# Patient Record
Sex: FEMALE | Race: WHITE | NOT HISPANIC OR LATINO | Employment: FULL TIME | URBAN - METROPOLITAN AREA
[De-identification: names, ages, dates, MRNs, and addresses within clinical notes are randomized per-mention and may not be internally consistent; named-entity substitution may affect disease eponyms.]

---

## 2022-10-03 ENCOUNTER — OFFICE VISIT (OUTPATIENT)
Dept: URGENT CARE | Facility: CLINIC | Age: 32
End: 2022-10-03
Payer: COMMERCIAL

## 2022-10-03 ENCOUNTER — HOSPITAL ENCOUNTER (OUTPATIENT)
Dept: RADIOLOGY | Facility: HOSPITAL | Age: 32
Discharge: HOME | End: 2022-10-03
Payer: COMMERCIAL

## 2022-10-03 VITALS
HEIGHT: 67 IN | TEMPERATURE: 99 F | SYSTOLIC BLOOD PRESSURE: 156 MMHG | WEIGHT: 227.38 LBS | BODY MASS INDEX: 35.69 KG/M2 | DIASTOLIC BLOOD PRESSURE: 90 MMHG | RESPIRATION RATE: 16 BRPM | OXYGEN SATURATION: 100 % | HEART RATE: 58 BPM

## 2022-10-03 DIAGNOSIS — T14.90XA INJURY: Primary | ICD-10-CM

## 2022-10-03 DIAGNOSIS — T14.90XA INJURY: ICD-10-CM

## 2022-10-03 PROCEDURE — 99213 OFFICE O/P EST LOW 20 MIN: CPT | Mod: S$PBB,,,

## 2022-10-03 PROCEDURE — 73110 X-RAY EXAM OF WRIST: CPT | Mod: TC,LT

## 2022-10-03 PROCEDURE — 99213 PR OFFICE/OUTPT VISIT, EST, LEVL III, 20-29 MIN: ICD-10-PCS | Mod: S$PBB,,,

## 2022-10-03 PROCEDURE — 99204 OFFICE O/P NEW MOD 45 MIN: CPT | Mod: PBBFAC

## 2022-10-03 PROCEDURE — 73130 X-RAY EXAM OF HAND: CPT | Mod: TC,LT

## 2022-10-03 RX ORDER — LAMOTRIGINE 100 MG/1
100 TABLET ORAL 2 TIMES DAILY
COMMUNITY
Start: 2022-08-15

## 2022-10-03 RX ORDER — HYDROXYZINE HYDROCHLORIDE 25 MG/1
25 TABLET, FILM COATED ORAL DAILY
COMMUNITY
Start: 2022-07-28

## 2022-10-03 RX ORDER — VENLAFAXINE HYDROCHLORIDE 75 MG/1
75 CAPSULE, EXTENDED RELEASE ORAL DAILY
COMMUNITY
Start: 2022-08-16

## 2022-10-03 RX ORDER — CLONIDINE HYDROCHLORIDE 0.1 MG/1
0.1 TABLET ORAL 2 TIMES DAILY
Status: ON HOLD | COMMUNITY
Start: 2022-08-15 | End: 2023-09-21 | Stop reason: HOSPADM

## 2022-10-03 RX ORDER — MODAFINIL 200 MG/1
200 TABLET ORAL DAILY
COMMUNITY
Start: 2022-09-30

## 2022-10-03 NOTE — PROGRESS NOTES
"Subjective:       Patient ID: Cha Puckett is a 31 y.o. female.    Vitals:  height is 5' 6.93" (1.7 m) and weight is 103.1 kg (227 lb 6.4 oz). Her temperature is 98.8 °F (37.1 °C). Her blood pressure is 156/90 (abnormal) and her pulse is 58 (abnormal). Her respiration is 16 and oxygen saturation is 100%.     Chief Complaint: Injury (Lt hand, 5th digit pain, hit fridge around 11am yesterday.)    Pt states punching a refrigerator yesterday afternoon with L hand. States since then she has had swelling and pain in knuckles and wrist.    Injury  Associated symptoms include arthralgias and joint swelling.     Constitution: Negative.   HENT: Negative.     Neck: neck negative.   Cardiovascular: Negative.    Eyes: Negative.    Respiratory: Negative.     Gastrointestinal: Negative.    Genitourinary: Negative.    Musculoskeletal:  Positive for pain, trauma, joint pain and joint swelling.   Skin: Negative.    Neurological: Negative.      Objective:      Physical Exam   HENT:   Head: Normocephalic.   Eyes: Pupils are equal, round, and reactive to light.   Cardiovascular: Normal pulses. Bradycardia present.   Pulmonary/Chest: Effort normal.   Abdominal: Normal appearance.   Musculoskeletal:         General: Swelling, tenderness and signs of injury present.      Left hand: Left ring finger: Exhibits decreased ROM and ecchymosis. There is tenderness of the MCP joint. Left little finger: Exhibits decreased ROM and ecchymosis. There is tenderness of the MCP joint.        Hands:    Neurological: She is alert.   Skin: Skin is warm and dry. Capillary refill takes less than 2 seconds. bruising   Denies snuffbox tenderness.     Narrative & Impression  EXAMINATION:  XR HAND COMPLETE 3 VIEW LEFT; XR WRIST COMPLETE 3 VIEWS LEFT     CLINICAL HISTORY:  Injury, unspecified, initial encounter     COMPARISON:  None.     FINDINGS:  There is no acute fracture or malalignment.  The soft tissues are unremarkable.     Impression:     No acute " abnormality identified.    EXAMINATION:  XR HAND COMPLETE 3 VIEW LEFT; XR WRIST COMPLETE 3 VIEWS LEFT     CLINICAL HISTORY:  Injury, unspecified, initial encounter     COMPARISON:  None.     FINDINGS:  There is no acute fracture or malalignment.  The soft tissues are unremarkable.     Impression:     No acute abnormality identified.    Assessment:       1. Injury            Plan:         Injury  -     XR HAND COMPLETE 3 VIEW LEFT; Future; Expected date: 10/03/2022  -     X-Ray Wrist Complete Left; Future; Expected date: 10/03/2022    No acute abnormality identified.    Rest, ice and elevate wrist.    Tylenol and Ibuprofen for pain.

## 2023-09-18 ENCOUNTER — HOSPITAL ENCOUNTER (EMERGENCY)
Facility: HOSPITAL | Age: 33
Discharge: HOME | End: 2023-09-18
Attending: INTERNAL MEDICINE
Payer: COMMERCIAL

## 2023-09-18 VITALS
TEMPERATURE: 98 F | DIASTOLIC BLOOD PRESSURE: 93 MMHG | OXYGEN SATURATION: 97 % | HEART RATE: 64 BPM | SYSTOLIC BLOOD PRESSURE: 158 MMHG | BODY MASS INDEX: 34.1 KG/M2 | RESPIRATION RATE: 20 BRPM | HEIGHT: 68 IN | WEIGHT: 225 LBS

## 2023-09-18 DIAGNOSIS — S99.919A ANKLE INJURY, INITIAL ENCOUNTER: ICD-10-CM

## 2023-09-18 DIAGNOSIS — S82.831A CLOSED FRACTURE OF DISTAL END OF RIGHT FIBULA, UNSPECIFIED FRACTURE MORPHOLOGY, INITIAL ENCOUNTER: Primary | ICD-10-CM

## 2023-09-18 DIAGNOSIS — M25.371 UNSTABLE RIGHT ANKLE: ICD-10-CM

## 2023-09-18 DIAGNOSIS — S80.211A ABRASION OF RIGHT KNEE, INITIAL ENCOUNTER: ICD-10-CM

## 2023-09-18 PROCEDURE — 99283 EMERGENCY DEPT VISIT LOW MDM: CPT | Mod: 25

## 2023-09-18 PROCEDURE — 25000003 PHARM REV CODE 250: Performed by: INTERNAL MEDICINE

## 2023-09-18 PROCEDURE — 29515 APPLICATION SHORT LEG SPLINT: CPT | Mod: RT

## 2023-09-18 RX ORDER — OXYCODONE AND ACETAMINOPHEN 5; 325 MG/1; MG/1
1 TABLET ORAL EVERY 6 HOURS PRN
Qty: 15 TABLET | Refills: 0 | Status: ON HOLD | OUTPATIENT
Start: 2023-09-18 | End: 2023-09-21 | Stop reason: HOSPADM

## 2023-09-18 RX ORDER — ONDANSETRON 4 MG/1
4 TABLET, ORALLY DISINTEGRATING ORAL EVERY 6 HOURS PRN
Qty: 15 TABLET | Refills: 0 | Status: SHIPPED | OUTPATIENT
Start: 2023-09-18

## 2023-09-18 RX ORDER — ONDANSETRON 4 MG/1
4 TABLET, ORALLY DISINTEGRATING ORAL ONCE
Status: COMPLETED | OUTPATIENT
Start: 2023-09-18 | End: 2023-09-18

## 2023-09-18 RX ORDER — OXYCODONE AND ACETAMINOPHEN 5; 325 MG/1; MG/1
1 TABLET ORAL ONCE
Status: COMPLETED | OUTPATIENT
Start: 2023-09-18 | End: 2023-09-18

## 2023-09-18 RX ORDER — IBUPROFEN 600 MG/1
600 TABLET ORAL EVERY 6 HOURS PRN
Qty: 20 TABLET | Refills: 0 | Status: ON HOLD | OUTPATIENT
Start: 2023-09-18 | End: 2023-09-21 | Stop reason: HOSPADM

## 2023-09-18 RX ADMIN — ONDANSETRON 4 MG: 4 TABLET, ORALLY DISINTEGRATING ORAL at 07:09

## 2023-09-18 RX ADMIN — BACITRACIN ZINC, NEOMYCIN SULFATE, AND POLYMYXIN B SULFATE: 400; 3.5; 5 OINTMENT TOPICAL at 07:09

## 2023-09-18 RX ADMIN — OXYCODONE HYDROCHLORIDE AND ACETAMINOPHEN 1 TABLET: 5; 325 TABLET ORAL at 07:09

## 2023-09-19 ENCOUNTER — LAB VISIT (OUTPATIENT)
Dept: LAB | Facility: HOSPITAL | Age: 33
End: 2023-09-19
Attending: REHABILITATION UNIT
Payer: COMMERCIAL

## 2023-09-19 ENCOUNTER — HOSPITAL ENCOUNTER (OUTPATIENT)
Dept: RADIOLOGY | Facility: CLINIC | Age: 33
Discharge: HOME | End: 2023-09-19
Attending: REHABILITATION UNIT
Payer: COMMERCIAL

## 2023-09-19 ENCOUNTER — OFFICE VISIT (OUTPATIENT)
Dept: ORTHOPEDICS | Facility: CLINIC | Age: 33
End: 2023-09-19
Payer: COMMERCIAL

## 2023-09-19 VITALS
SYSTOLIC BLOOD PRESSURE: 152 MMHG | BODY MASS INDEX: 34.1 KG/M2 | WEIGHT: 225 LBS | HEART RATE: 59 BPM | TEMPERATURE: 99 F | DIASTOLIC BLOOD PRESSURE: 65 MMHG | HEIGHT: 68 IN

## 2023-09-19 DIAGNOSIS — S82.831A CLOSED FRACTURE OF DISTAL END OF RIGHT FIBULA, UNSPECIFIED FRACTURE MORPHOLOGY, INITIAL ENCOUNTER: Primary | ICD-10-CM

## 2023-09-19 DIAGNOSIS — Z01.812 PRE-OPERATIVE LABORATORY EXAMINATION: ICD-10-CM

## 2023-09-19 DIAGNOSIS — S82.831A CLOSED FRACTURE OF DISTAL END OF RIGHT FIBULA, UNSPECIFIED FRACTURE MORPHOLOGY, INITIAL ENCOUNTER: ICD-10-CM

## 2023-09-19 LAB
ALBUMIN SERPL-MCNC: 4.3 G/DL (ref 3.5–5)
ALBUMIN/GLOB SERPL: 1.1 RATIO (ref 1.1–2)
ALP SERPL-CCNC: 66 UNIT/L (ref 40–150)
ALT SERPL-CCNC: 31 UNIT/L (ref 0–55)
AST SERPL-CCNC: 27 UNIT/L (ref 5–34)
BASOPHILS # BLD AUTO: 0.06 X10(3)/MCL
BASOPHILS NFR BLD AUTO: 0.7 %
BILIRUB SERPL-MCNC: 0.8 MG/DL
BUN SERPL-MCNC: 8.6 MG/DL (ref 7–18.7)
CALCIUM SERPL-MCNC: 9.8 MG/DL (ref 8.4–10.2)
CHLORIDE SERPL-SCNC: 104 MMOL/L (ref 98–107)
CO2 SERPL-SCNC: 26 MMOL/L (ref 22–29)
CREAT SERPL-MCNC: 0.79 MG/DL (ref 0.55–1.02)
EOSINOPHIL # BLD AUTO: 0.46 X10(3)/MCL (ref 0–0.9)
EOSINOPHIL NFR BLD AUTO: 5.1 %
ERYTHROCYTE [DISTWIDTH] IN BLOOD BY AUTOMATED COUNT: 12.4 % (ref 11.5–17)
GFR SERPLBLD CREATININE-BSD FMLA CKD-EPI: >60 MLS/MIN/1.73/M2
GLOBULIN SER-MCNC: 3.8 GM/DL (ref 2.4–3.5)
GLUCOSE SERPL-MCNC: 97 MG/DL (ref 74–100)
HCT VFR BLD AUTO: 40.5 % (ref 37–47)
HGB BLD-MCNC: 14.1 G/DL (ref 12–16)
IMM GRANULOCYTES # BLD AUTO: 0.03 X10(3)/MCL (ref 0–0.04)
IMM GRANULOCYTES NFR BLD AUTO: 0.3 %
LYMPHOCYTES # BLD AUTO: 1.88 X10(3)/MCL (ref 0.6–4.6)
LYMPHOCYTES NFR BLD AUTO: 20.7 %
MCH RBC QN AUTO: 32.6 PG (ref 27–31)
MCHC RBC AUTO-ENTMCNC: 34.8 G/DL (ref 33–36)
MCV RBC AUTO: 93.5 FL (ref 80–94)
MONOCYTES # BLD AUTO: 0.7 X10(3)/MCL (ref 0.1–1.3)
MONOCYTES NFR BLD AUTO: 7.7 %
NEUTROPHILS # BLD AUTO: 5.96 X10(3)/MCL (ref 2.1–9.2)
NEUTROPHILS NFR BLD AUTO: 65.5 %
NRBC BLD AUTO-RTO: 0 %
PLATELET # BLD AUTO: 232 X10(3)/MCL (ref 130–400)
PMV BLD AUTO: 10.4 FL (ref 7.4–10.4)
POTASSIUM SERPL-SCNC: 3.7 MMOL/L (ref 3.5–5.1)
PROT SERPL-MCNC: 8.1 GM/DL (ref 6.4–8.3)
RBC # BLD AUTO: 4.33 X10(6)/MCL (ref 4.2–5.4)
SODIUM SERPL-SCNC: 141 MMOL/L (ref 136–145)
WBC # SPEC AUTO: 9.09 X10(3)/MCL (ref 4.5–11.5)

## 2023-09-19 PROCEDURE — 3078F PR MOST RECENT DIASTOLIC BLOOD PRESSURE < 80 MM HG: ICD-10-PCS | Mod: CPTII,,, | Performed by: REHABILITATION UNIT

## 2023-09-19 PROCEDURE — 85025 COMPLETE CBC W/AUTO DIFF WBC: CPT

## 2023-09-19 PROCEDURE — 1159F MED LIST DOCD IN RCRD: CPT | Mod: CPTII,,, | Performed by: REHABILITATION UNIT

## 2023-09-19 PROCEDURE — 3077F PR MOST RECENT SYSTOLIC BLOOD PRESSURE >= 140 MM HG: ICD-10-PCS | Mod: CPTII,,, | Performed by: REHABILITATION UNIT

## 2023-09-19 PROCEDURE — 3008F PR BODY MASS INDEX (BMI) DOCUMENTED: ICD-10-PCS | Mod: CPTII,,, | Performed by: REHABILITATION UNIT

## 2023-09-19 PROCEDURE — 73590 XR TIBIA FIBULA 2 VIEW RIGHT: ICD-10-PCS | Mod: RT,,, | Performed by: REHABILITATION UNIT

## 2023-09-19 PROCEDURE — 1159F PR MEDICATION LIST DOCUMENTED IN MEDICAL RECORD: ICD-10-PCS | Mod: CPTII,,, | Performed by: REHABILITATION UNIT

## 2023-09-19 PROCEDURE — 80053 COMPREHEN METABOLIC PANEL: CPT

## 2023-09-19 PROCEDURE — 3077F SYST BP >= 140 MM HG: CPT | Mod: CPTII,,, | Performed by: REHABILITATION UNIT

## 2023-09-19 PROCEDURE — 36415 COLL VENOUS BLD VENIPUNCTURE: CPT

## 2023-09-19 PROCEDURE — 99204 OFFICE O/P NEW MOD 45 MIN: CPT | Mod: ,,, | Performed by: REHABILITATION UNIT

## 2023-09-19 PROCEDURE — 3078F DIAST BP <80 MM HG: CPT | Mod: CPTII,,, | Performed by: REHABILITATION UNIT

## 2023-09-19 PROCEDURE — 3008F BODY MASS INDEX DOCD: CPT | Mod: CPTII,,, | Performed by: REHABILITATION UNIT

## 2023-09-19 PROCEDURE — 99204 PR OFFICE/OUTPT VISIT, NEW, LEVL IV, 45-59 MIN: ICD-10-PCS | Mod: ,,, | Performed by: REHABILITATION UNIT

## 2023-09-19 PROCEDURE — 73590 X-RAY EXAM OF LOWER LEG: CPT | Mod: RT,,, | Performed by: REHABILITATION UNIT

## 2023-09-19 RX ORDER — ACETAMINOPHEN 500 MG
1000 TABLET ORAL EVERY 6 HOURS PRN
COMMUNITY

## 2023-09-19 RX ORDER — CHOLECALCIFEROL (VITAMIN D3) 25 MCG
1000 TABLET ORAL DAILY
COMMUNITY

## 2023-09-19 NOTE — H&P (VIEW-ONLY)
Subjective:      Patient ID: Cha Puckett is a 32 y.o. female.    Chief Complaint: Pain (Patient was roller skating in her house when she rolled her ankle first ever injury to her ankle went to ED last night had XR done was put inot a splint wears a [employer] 8 1/2 shoe)    HPI:   Cha Puckett is a 32 y.o. female who presents today for initial evaluation of  the right lower leg. Patient complains of injury to the right ankle. This is evaluated as a personal injury. The injury occurred 1 day ago, and occurred while she was roller skating in her house.  The patient states the ankle rolled inward at the time of injury.  She did hear or sense a pop or snap at the time of the injury. The patient notes pain and moderate swelling of the ankle since the injury. She went to the ED. Radiographs and note reviewed.  Radiographs showed an ankle fracture.  She was placed into a splint.  She was discharged with appropriate pain medication and crutches.  She has been nonweightbearing.  She denies any sensory or motor changes distally.  She works in a .  She is a nonsmoker.  She does use medicinal marijuana.    Past Medical History:   Diagnosis Date    Anxiety     Borderline personality disorder     Depression     Panic disorder (episodic paroxysmal anxiety)      Past Surgical History:   Procedure Laterality Date    APPENDECTOMY      COSMETIC SURGERY  05/2020    KNEE SURGERY Left     x 10    SKIN BIOPSY      TUBAL LIGATION      TYMPANOSTOMY TUBE PLACEMENT      WISDOM TOOTH EXTRACTION       Social History     Socioeconomic History    Marital status:    Tobacco Use    Smoking status: Never    Smokeless tobacco: Never   Substance and Sexual Activity    Alcohol use: Not Currently    Drug use: Yes     Types: Marijuana    Sexual activity: Yes     Partners: Female, Male     Birth control/protection: See Surgical Hx         Current Outpatient Medications:     ibuprofen (ADVIL,MOTRIN) 600 MG tablet, Take 1 tablet (600 mg total) by  "mouth every 6 (six) hours as needed for Pain., Disp: 20 tablet, Rfl: 0    lamoTRIgine (LAMICTAL) 100 MG tablet, Take 100 mg by mouth 2 (two) times daily., Disp: , Rfl:     modafiniL (PROVIGIL) 200 MG Tab, Take 200 mg by mouth once daily., Disp: , Rfl:     oxyCODONE-acetaminophen (PERCOCET) 5-325 mg per tablet, Take 1 tablet by mouth every 6 (six) hours as needed for Pain., Disp: 15 tablet, Rfl: 0    vitamin D (VITAMIN D3) 1000 units Tab, Take 1,000 Units by mouth once daily., Disp: , Rfl:     cloNIDine (CATAPRES) 0.1 MG tablet, Take 0.1 mg by mouth 2 (two) times daily., Disp: , Rfl:     hydrOXYzine HCL (ATARAX) 25 MG tablet, Take 25 mg by mouth once daily., Disp: , Rfl:     ondansetron (ZOFRAN-ODT) 4 MG TbDL, Take 1 tablet (4 mg total) by mouth every 6 (six) hours as needed (nausea). (Patient not taking: Reported on 9/19/2023), Disp: 15 tablet, Rfl: 0    venlafaxine (EFFEXOR-XR) 75 MG 24 hr capsule, Take 75 mg by mouth once daily., Disp: , Rfl:   No current facility-administered medications for this visit.  Review of patient's allergies indicates:   Allergen Reactions    Amoxicillin Hives       BP (!) 152/65 (BP Location: Left arm, Patient Position: Sitting) Comment: unable to obtain  Pulse (!) 59   Temp 99.2 °F (37.3 °C)   Ht 5' 8" (1.727 m)   Wt 102.1 kg (225 lb)   LMP 08/22/2023   BMI 34.21 kg/m²     Comprehensive review of systems completed and negative except as per HPI.        Objective:   Head: Normocephalic, without obvious abnormality, atraumatic  Eyes: conjunctivae/corneas clear. EOM's intact  Ears: normal external appearance  Nose: Nares normal. Septum midline. Mucosa normal. No drainage  Throat: normal findings: lips normal without lesions  Lungs: unlabored breathing on room air  Chest wall: symmetric chest rise  Heart: regular rate and rhythm  Pulses: 2+ and symmetric  Skin: Skin color, texture, turgor normal. No rashes or lesions  Neurologic: Grossly normal    right lower extremity     General :  "   alert, appears stated age, and cooperative   Gait:  Unable to assess/with crutches.    Right Ankle  Proximal Fibula:   +tenderness noted   Edema:   mild swelling of the lateral surface   Ecchymosis:   is not observed    Active ROM:  25% of normal    Passive ROM:   25% of normal    Palpation:  severe tenderness of the medial, lateral surface   Stability.:   michael   Syndosmosis:   syndesmotic ligament is tender   Sensation:    intact to light touch   Pulses:  normal DP and PT pulses   Motor: 3/5 EHL/FHL/TA/GS    Warm well perfused lower extremity with capillary refill less than 2 seconds and sensation intact to light touch in the terminal nerve distributions. Calf soft and easily compressible without clinical sign of DVT. No palpable popliteal lymphadenopathy    Assessment:     Imaging  X-Ray Ankle Complete Right  Narrative: EXAMINATION:  XR ANKLE COMPLETE 3 VIEW RIGHT    CLINICAL HISTORY:  Unspecified injury of unspecified ankle, initial encounter    TECHNIQUE:  Three views    COMPARISON:  None available.    FINDINGS:  Distal fibula is fractured.  Fracture line extends distally to about the level of the plafond.  There is mild displacement.  No disruption of the ankle mortise.  Small calcaneal enthesophyte.  Impression: Fracture distal fibula.    Electronically signed by: Alli Del Rio  Date:    09/18/2023  Time:    19:45    Previously obtained radiographs of the right ankle showed displaced distal fibula fracture with widening of the medial clear space and lateral translation of the talus. Small fracture of posterior malleolus.     Two-view radiographs of the right tibia obtained today personally reviewed showing distal fibula fracture.  No proximal injury.  Visualized knee joint space intact. Small fracture of posterior malleolus.         1. Closed fracture of distal end of right fibula, unspecified fracture morphology, initial encounter    2. Pre-operative laboratory examination          Plan:       Orders Placed  This Encounter    X-Ray Tibia Fibula 2 View Right    CT Ankle (Including Hindfoot) Without Contrast Right    CBC Auto Differential    Comprehensive Metabolic Panel    Case Request Operating Room: FIXATION, SYNDESMOSIS, ANKLE, ORIF, FRACTURE, TIBIA OR FIBULA     Imaging and exam findings discussed with the patient. She sustained an acute right ankle fracture.  There is mild displacement with widening of the medial clear space.  She has tenderness medially.  She also has a small fracture of the posterior malleolus.  This is unstable and meets operative criteria.  We will proceed with CT scan for further evaluation of the posterior malleolar component. We discussed operative and nonoperative options. The patient had an opportunity to ask questions. All questions were answered. The risks and benefits of surgery were discussed with the patient, including but not limited to bleeding, infection, damage to surrounding nerves and structures, need for further surgery, repair failure, anesthesia risk, progression of arthritis, blood clots, and medical risks of surgery. The patient voiced understanding of the risks and benefits and provided written consent to the procedure. Plan for open reduction and internal fixation of right distal fibula fracture with possible syndesmotic fixation and any indicated procedures. Patient will be scheduled for surgery at a mutually convenient date in the near future.  She was placed back into a well-padded short-leg splint.  She was instructed on nonweightbearing status and strict elevation.  Crutch ambulation.  Pain control. All questions were answered and patient is happy and in agreement with the plan.

## 2023-09-19 NOTE — ED PROVIDER NOTES
Source of History:  Patient, no limitations    Chief complaint:  Joint Swelling (Fall while roller skating deformity noted right ankle abrasion to riht knee)      HPI:  Cha Puckett is a 32 y.o. female presenting with Joint Swelling (Fall while roller skating deformity noted right ankle abrasion to riht knee)         The patient complains of pain in the distal aspect of the right leg without radiation after a twisting injury. Onset of symptoms was abrupt starting a few minutes ago. Patient describes pain as aching and throbbing. Pain severity now is severe. Pain is aggravated by movement. Pain is alleviated by immobilization. Symptoms associated with pain include loss of motion. The patient denies other injuries.  Care prior to arrival consisted of nothing        Review of Systems   Constitutional symptoms:  Negative except as documented in HPI.   Skin symptoms:  Negative except as documented in HPI.   HEENT symptoms:  Negative except as documented in HPI.   Respiratory symptoms:  Negative except as documented in HPI.   Cardiovascular symptoms:  Negative except as documented in HPI.   Gastrointestinal symptoms:  Negative except as documented in HPI.    Genitourinary symptoms:  Negative except as documented in HPI.   Musculoskeletal symptoms:  Negative except as documented in HPI.   Neurologic symptoms:  Negative except as documented in HPI.   Psychiatric symptoms:  Negative except as documented in HPI.   Allergy/immunologic symptoms:  Negative except as documented in HPI.             Additional review of systems information: All other systems reviewed and otherwise negative.      Review of patient's allergies indicates:   Allergen Reactions    Amoxicillin Hives       PMH:  As per HPI and below:    Past Medical History:   Diagnosis Date    Anxiety     Borderline personality disorder     Depression     Panic disorder (episodic paroxysmal anxiety)         No family history on file.    Past Surgical History:  "  Procedure Laterality Date    APPENDECTOMY      KNEE SURGERY Left     x 10    SKIN BIOPSY      TUBAL LIGATION      TYMPANOSTOMY TUBE PLACEMENT      WISDOM TOOTH EXTRACTION         Social History     Tobacco Use    Smoking status: Never    Smokeless tobacco: Never   Substance Use Topics    Alcohol use: Never    Drug use: Yes     Types: Marijuana       There is no problem list on file for this patient.       Physical Exam:    BP (!) 158/93 (BP Location: Right arm, Patient Position: Lying)   Pulse 64   Temp 98.4 °F (36.9 °C) (Oral)   Resp 20   Ht 5' 8" (1.727 m)   Wt 102.1 kg (225 lb)   LMP  (LMP Unknown)   SpO2 97%   Breastfeeding No   BMI 34.21 kg/m²     Nursing note and vital signs reviewed.    General:  Alert, no acute distress.   Skin: Normal for Ethnic Origin, No cyanosis  HEENT: Normocephalic and atraumatic, Vision unchanged, Pupils symmetric, No icterus , Nasal mucosa is pink and moist  Cardiovascular:  Regular rate and rhythm, No edema  Chest Wall: No deformity, equal chest rise  Respiratory:  Lungs are clear to auscultation, respirations are non-labored.    Musculoskeletal:  right distal tib/fib deformity, Normal perfusion to all extremities  Gastrointestinal:  Soft, Non distended  Neurological:  Alert and oriented, normal motor observed, normal speech observed.    Psychiatric:  Cooperative, appropriate mood & affect.        Labs that have been ordered have been independently reviewed and interpreted by myself.     Old Chart Reviewed.      Initial Impression/ Differential Dx:  Ankle sprain, fracture of foot or ankle, cellulitis, referred pain from knee or hip, contusion, abrasion,      MDM:      Reviewed Nurses Note.    Reviewed Pertinent old records.    Orders Placed This Encounter    ORTHOPEDIC BRACING FOR HOME USE - LOWER EXTREMITY    X-Ray Ankle Complete Right    Ambulatory referral/consult to Orthopedics    Application short leg splint    Stirrup Splint    Crutches    oxyCODONE-acetaminophen " 5-325 mg per tablet 1 tablet    ondansetron disintegrating tablet 4 mg    neomycin-bacitracin-polymyxin ointment    oxyCODONE-acetaminophen (PERCOCET) 5-325 mg per tablet    ondansetron (ZOFRAN-ODT) 4 MG TbDL    ibuprofen (ADVIL,MOTRIN) 600 MG tablet                    Labs Reviewed - No data to display       X-Ray Ankle Complete Right   ED Interpretation   Abnormal   A ankle X-Ray was ordered. My reading of this film is +fx fibula. (Pending review by Radiologist.)        Final Result      Fracture distal fibula.         Electronically signed by: Alli Del Rio   Date:    09/18/2023   Time:    19:45           No visits with results within 1 Day(s) from this visit.   Latest known visit with results is:   No results found for any previous visit.       Imaging Results              X-Ray Ankle Complete Right (Final result)  Result time 09/18/23 19:45:35      Final result by Alli Del Rio MD (09/18/23 19:45:35)                   Impression:      Fracture distal fibula.      Electronically signed by: Alli Del Rio  Date:    09/18/2023  Time:    19:45               Narrative:    EXAMINATION:  XR ANKLE COMPLETE 3 VIEW RIGHT    CLINICAL HISTORY:  Unspecified injury of unspecified ankle, initial encounter    TECHNIQUE:  Three views    COMPARISON:  None available.    FINDINGS:  Distal fibula is fractured.  Fracture line extends distally to about the level of the plafond.  There is mild displacement.  No disruption of the ankle mortise.  Small calcaneal enthesophyte.                        Wet Read by Yasmany Selby DO (09/18/23 19:33:22, [facility] - Emergency Dept, Emergency Medicine) Flagged as Abnormal    A ankle X-Ray was ordered. My reading of this film is +fx fibula. (Pending review by Radiologist.)                                                                           Diagnostic Impression:    1. Closed fracture of distal end of right fibula, unspecified fracture morphology, initial  encounter    2. Ankle injury, initial encounter    3. Unstable right ankle    4. Abrasion of right knee, initial encounter         ED Disposition Condition    Discharge Stable             Follow-up Information       [facility] - Emergency Dept.    Specialty: Emergency Medicine  Why: If symptoms worsen  Contact information:  [address]  [facility] [phone]             Schedule an appointment as soon as possible for a visit  with Serafin Green DO.    Specialty: Orthopedic Surgery  Contact information:  [address] [address]  [phone]                              ED Prescriptions       Medication Sig Dispense Start Date End Date Auth. Provider    oxyCODONE-acetaminophen (PERCOCET) 5-325 mg per tablet Take 1 tablet by mouth every 6 (six) hours as needed for Pain. 15 tablet 9/18/2023 -- Yasmany Selby DO    ondansetron (ZOFRAN-ODT) 4 MG TbDL Take 1 tablet (4 mg total) by mouth every 6 (six) hours as needed (nausea). 15 tablet 9/18/2023 -- Yasmany Selby DO    ibuprofen (ADVIL,MOTRIN) 600 MG tablet Take 1 tablet (600 mg total) by mouth every 6 (six) hours as needed for Pain. 20 tablet 9/18/2023 -- Yasmany Selby DO          Follow-up Information       Follow up With Specialties Details Why Contact Info    [facility] - Emergency Dept Emergency Medicine  If symptoms worsen [address]  [facility] [id number] [phone]    Serafin Green DO Orthopedic Surgery Schedule an appointment as soon as possible for a visit   [address] [address]  [phone]               Yasmany Selby DO  09/18/23 1952

## 2023-09-19 NOTE — LETTER
[facility]  [address] [place] [address]. [address]  [place], [address]  Phone: [phone]  Fax: [phone]    Name:Cha Puckett  :1990   Date:2023     PATIENT IS UNABLE TO WORK AS OF:23  [_] Pending treatment.  [_] For approximately [_] Days [_] Weeks [_] Months  [_] Pending diagnostic testing.  [XX] Pending surgical treatment.  [XX] For approximately 3 months (Post Surgery)    Comments:     Mrs. Puckett suffered a fracture of her right ankle which will require surgery to correct. Once surgery is completed and her first post op visit a timeline will be established. Please feel free to contact me at [phone] with any questions.         Byron Fraser MD

## 2023-09-19 NOTE — H&P
Subjective:      Patient ID: Cha Puckett is a 32 y.o. female.    Chief Complaint: Pain (Patient was roller skating in her house when she rolled her ankle first ever injury to her ankle went to ED last night had XR done was put inot a splint wears a [employer] 8 1/2 shoe)    HPI:   Cha Puckett is a 32 y.o. female who presents today for initial evaluation of  the right lower leg. Patient complains of injury to the right ankle. This is evaluated as a personal injury. The injury occurred 1 day ago, and occurred while she was roller skating in her house.  The patient states the ankle rolled inward at the time of injury.  She did hear or sense a pop or snap at the time of the injury. The patient notes pain and moderate swelling of the ankle since the injury. She went to the ED. Radiographs and note reviewed.  Radiographs showed an ankle fracture.  She was placed into a splint.  She was discharged with appropriate pain medication and crutches.  She has been nonweightbearing.  She denies any sensory or motor changes distally.  She works in a .  She is a nonsmoker.  She does use medicinal marijuana.    Past Medical History:   Diagnosis Date    Anxiety     Borderline personality disorder     Depression     Panic disorder (episodic paroxysmal anxiety)      Past Surgical History:   Procedure Laterality Date    APPENDECTOMY      COSMETIC SURGERY  05/2020    KNEE SURGERY Left     x 10    SKIN BIOPSY      TUBAL LIGATION      TYMPANOSTOMY TUBE PLACEMENT      WISDOM TOOTH EXTRACTION       Social History     Socioeconomic History    Marital status:    Tobacco Use    Smoking status: Never    Smokeless tobacco: Never   Substance and Sexual Activity    Alcohol use: Not Currently    Drug use: Yes     Types: Marijuana    Sexual activity: Yes     Partners: Female, Male     Birth control/protection: See Surgical Hx         Current Outpatient Medications:     ibuprofen (ADVIL,MOTRIN) 600 MG tablet, Take 1 tablet (600 mg total) by  "mouth every 6 (six) hours as needed for Pain., Disp: 20 tablet, Rfl: 0    lamoTRIgine (LAMICTAL) 100 MG tablet, Take 100 mg by mouth 2 (two) times daily., Disp: , Rfl:     modafiniL (PROVIGIL) 200 MG Tab, Take 200 mg by mouth once daily., Disp: , Rfl:     oxyCODONE-acetaminophen (PERCOCET) 5-325 mg per tablet, Take 1 tablet by mouth every 6 (six) hours as needed for Pain., Disp: 15 tablet, Rfl: 0    vitamin D (VITAMIN D3) 1000 units Tab, Take 1,000 Units by mouth once daily., Disp: , Rfl:     cloNIDine (CATAPRES) 0.1 MG tablet, Take 0.1 mg by mouth 2 (two) times daily., Disp: , Rfl:     hydrOXYzine HCL (ATARAX) 25 MG tablet, Take 25 mg by mouth once daily., Disp: , Rfl:     ondansetron (ZOFRAN-ODT) 4 MG TbDL, Take 1 tablet (4 mg total) by mouth every 6 (six) hours as needed (nausea). (Patient not taking: Reported on 9/19/2023), Disp: 15 tablet, Rfl: 0    venlafaxine (EFFEXOR-XR) 75 MG 24 hr capsule, Take 75 mg by mouth once daily., Disp: , Rfl:   No current facility-administered medications for this visit.  Review of patient's allergies indicates:   Allergen Reactions    Amoxicillin Hives       BP (!) 152/65 (BP Location: Left arm, Patient Position: Sitting) Comment: unable to obtain  Pulse (!) 59   Temp 99.2 °F (37.3 °C)   Ht 5' 8" (1.727 m)   Wt 102.1 kg (225 lb)   LMP 08/22/2023   BMI 34.21 kg/m²     Comprehensive review of systems completed and negative except as per HPI.        Objective:   Head: Normocephalic, without obvious abnormality, atraumatic  Eyes: conjunctivae/corneas clear. EOM's intact  Ears: normal external appearance  Nose: Nares normal. Septum midline. Mucosa normal. No drainage  Throat: normal findings: lips normal without lesions  Lungs: unlabored breathing on room air  Chest wall: symmetric chest rise  Heart: regular rate and rhythm  Pulses: 2+ and symmetric  Skin: Skin color, texture, turgor normal. No rashes or lesions  Neurologic: Grossly normal    right lower extremity     General :  "   alert, appears stated age, and cooperative   Gait:  Unable to assess/with crutches.    Right Ankle  Proximal Fibula:   +tenderness noted   Edema:   mild swelling of the lateral surface   Ecchymosis:   is not observed    Active ROM:  25% of normal    Passive ROM:   25% of normal    Palpation:  severe tenderness of the medial, lateral surface   Stability.:   michael   Syndosmosis:   syndesmotic ligament is tender   Sensation:    intact to light touch   Pulses:  normal DP and PT pulses   Motor: 3/5 EHL/FHL/TA/GS    Warm well perfused lower extremity with capillary refill less than 2 seconds and sensation intact to light touch in the terminal nerve distributions. Calf soft and easily compressible without clinical sign of DVT. No palpable popliteal lymphadenopathy    Assessment:     Imaging  X-Ray Ankle Complete Right  Narrative: EXAMINATION:  XR ANKLE COMPLETE 3 VIEW RIGHT    CLINICAL HISTORY:  Unspecified injury of unspecified ankle, initial encounter    TECHNIQUE:  Three views    COMPARISON:  None available.    FINDINGS:  Distal fibula is fractured.  Fracture line extends distally to about the level of the plafond.  There is mild displacement.  No disruption of the ankle mortise.  Small calcaneal enthesophyte.  Impression: Fracture distal fibula.    Electronically signed by: Alli Del Rio  Date:    09/18/2023  Time:    19:45    Previously obtained radiographs of the right ankle showed displaced distal fibula fracture with widening of the medial clear space and lateral translation of the talus. Small fracture of posterior malleolus.     Two-view radiographs of the right tibia obtained today personally reviewed showing distal fibula fracture.  No proximal injury.  Visualized knee joint space intact. Small fracture of posterior malleolus.         1. Closed fracture of distal end of right fibula, unspecified fracture morphology, initial encounter    2. Pre-operative laboratory examination          Plan:       Orders Placed  This Encounter    X-Ray Tibia Fibula 2 View Right    CT Ankle (Including Hindfoot) Without Contrast Right    CBC Auto Differential    Comprehensive Metabolic Panel    Case Request Operating Room: FIXATION, SYNDESMOSIS, ANKLE, ORIF, FRACTURE, TIBIA OR FIBULA     Imaging and exam findings discussed with the patient. She sustained an acute right ankle fracture.  There is mild displacement with widening of the medial clear space.  She has tenderness medially.  She also has a small fracture of the posterior malleolus.  This is unstable and meets operative criteria.  We will proceed with CT scan for further evaluation of the posterior malleolar component. We discussed operative and nonoperative options. The patient had an opportunity to ask questions. All questions were answered. The risks and benefits of surgery were discussed with the patient, including but not limited to bleeding, infection, damage to surrounding nerves and structures, need for further surgery, repair failure, anesthesia risk, progression of arthritis, blood clots, and medical risks of surgery. The patient voiced understanding of the risks and benefits and provided written consent to the procedure. Plan for open reduction and internal fixation of right distal fibula fracture with possible syndesmotic fixation and any indicated procedures. Patient will be scheduled for surgery at a mutually convenient date in the near future.  She was placed back into a well-padded short-leg splint.  She was instructed on nonweightbearing status and strict elevation.  Crutch ambulation.  Pain control. All questions were answered and patient is happy and in agreement with the plan.

## 2023-09-19 NOTE — PROGRESS NOTES
Subjective:      Patient ID: Cha Puckett is a 32 y.o. female.    Chief Complaint: Pain (Patient was roller skating in her house when she rolled her ankle first ever injury to her ankle went to ED last night had XR done was put inot a splint wears a [employer] 8 1/2 shoe)    HPI:   Cha Puckett is a 32 y.o. female who presents today for initial evaluation of  the right lower leg. Patient complains of injury to the right ankle. This is evaluated as a personal injury. The injury occurred 1 day ago, and occurred while she was roller skating in her house.  The patient states the ankle rolled inward at the time of injury.  She did hear or sense a pop or snap at the time of the injury. The patient notes pain and moderate swelling of the ankle since the injury. She went to the ED. Radiographs and note reviewed.  Radiographs showed an ankle fracture.  She was placed into a splint.  She was discharged with appropriate pain medication and crutches.  She has been nonweightbearing.  She denies any sensory or motor changes distally.  She works in a .  She is a nonsmoker.  She does use medicinal marijuana.    Past Medical History:   Diagnosis Date    Anxiety     Borderline personality disorder     Depression     Panic disorder (episodic paroxysmal anxiety)      Past Surgical History:   Procedure Laterality Date    APPENDECTOMY      COSMETIC SURGERY  05/2020    KNEE SURGERY Left     x 10    SKIN BIOPSY      TUBAL LIGATION      TYMPANOSTOMY TUBE PLACEMENT      WISDOM TOOTH EXTRACTION       Social History     Socioeconomic History    Marital status:    Tobacco Use    Smoking status: Never    Smokeless tobacco: Never   Substance and Sexual Activity    Alcohol use: Not Currently    Drug use: Yes     Types: Marijuana    Sexual activity: Yes     Partners: Female, Male     Birth control/protection: See Surgical Hx         Current Outpatient Medications:     ibuprofen (ADVIL,MOTRIN) 600 MG tablet, Take 1 tablet (600 mg total) by  "mouth every 6 (six) hours as needed for Pain., Disp: 20 tablet, Rfl: 0    lamoTRIgine (LAMICTAL) 100 MG tablet, Take 100 mg by mouth 2 (two) times daily., Disp: , Rfl:     modafiniL (PROVIGIL) 200 MG Tab, Take 200 mg by mouth once daily., Disp: , Rfl:     oxyCODONE-acetaminophen (PERCOCET) 5-325 mg per tablet, Take 1 tablet by mouth every 6 (six) hours as needed for Pain., Disp: 15 tablet, Rfl: 0    vitamin D (VITAMIN D3) 1000 units Tab, Take 1,000 Units by mouth once daily., Disp: , Rfl:     cloNIDine (CATAPRES) 0.1 MG tablet, Take 0.1 mg by mouth 2 (two) times daily., Disp: , Rfl:     hydrOXYzine HCL (ATARAX) 25 MG tablet, Take 25 mg by mouth once daily., Disp: , Rfl:     ondansetron (ZOFRAN-ODT) 4 MG TbDL, Take 1 tablet (4 mg total) by mouth every 6 (six) hours as needed (nausea). (Patient not taking: Reported on 9/19/2023), Disp: 15 tablet, Rfl: 0    venlafaxine (EFFEXOR-XR) 75 MG 24 hr capsule, Take 75 mg by mouth once daily., Disp: , Rfl:   No current facility-administered medications for this visit.  Review of patient's allergies indicates:   Allergen Reactions    Amoxicillin Hives       BP (!) 152/65 (BP Location: Left arm, Patient Position: Sitting) Comment: unable to obtain  Pulse (!) 59   Temp 99.2 °F (37.3 °C)   Ht 5' 8" (1.727 m)   Wt 102.1 kg (225 lb)   LMP 08/22/2023   BMI 34.21 kg/m²     Comprehensive review of systems completed and negative except as per HPI.        Objective:   Head: Normocephalic, without obvious abnormality, atraumatic  Eyes: conjunctivae/corneas clear. EOM's intact  Ears: normal external appearance  Nose: Nares normal. Septum midline. Mucosa normal. No drainage  Throat: normal findings: lips normal without lesions  Lungs: unlabored breathing on room air  Chest wall: symmetric chest rise  Heart: regular rate and rhythm  Pulses: 2+ and symmetric  Skin: Skin color, texture, turgor normal. No rashes or lesions  Neurologic: Grossly normal    right lower extremity     General :  "   alert, appears stated age, and cooperative   Gait:  Unable to assess/with crutches.    Right Ankle  Proximal Fibula:   +tenderness noted   Edema:   mild swelling of the lateral surface   Ecchymosis:   is not observed    Active ROM:  25% of normal    Passive ROM:   25% of normal    Palpation:  severe tenderness of the medial, lateral surface   Stability.:   michael   Syndosmosis:   syndesmotic ligament is tender   Sensation:    intact to light touch   Pulses:  normal DP and PT pulses   Motor: 3/5 EHL/FHL/TA/GS    Warm well perfused lower extremity with capillary refill less than 2 seconds and sensation intact to light touch in the terminal nerve distributions. Calf soft and easily compressible without clinical sign of DVT. No palpable popliteal lymphadenopathy    Assessment:     Imaging  X-Ray Ankle Complete Right  Narrative: EXAMINATION:  XR ANKLE COMPLETE 3 VIEW RIGHT    CLINICAL HISTORY:  Unspecified injury of unspecified ankle, initial encounter    TECHNIQUE:  Three views    COMPARISON:  None available.    FINDINGS:  Distal fibula is fractured.  Fracture line extends distally to about the level of the plafond.  There is mild displacement.  No disruption of the ankle mortise.  Small calcaneal enthesophyte.  Impression: Fracture distal fibula.    Electronically signed by: Alli Del Rio  Date:    09/18/2023  Time:    19:45    Previously obtained radiographs of the right ankle showed displaced distal fibula fracture with widening of the medial clear space and lateral translation of the talus. Small fracture of posterior malleolus.     Two-view radiographs of the right tibia obtained today personally reviewed showing distal fibula fracture.  No proximal injury.  Visualized knee joint space intact. Small fracture of posterior malleolus.         1. Closed fracture of distal end of right fibula, unspecified fracture morphology, initial encounter    2. Pre-operative laboratory examination          Plan:       Orders Placed  This Encounter    X-Ray Tibia Fibula 2 View Right    CT Ankle (Including Hindfoot) Without Contrast Right    CBC Auto Differential    Comprehensive Metabolic Panel    Case Request Operating Room: FIXATION, SYNDESMOSIS, ANKLE, ORIF, FRACTURE, TIBIA OR FIBULA     Imaging and exam findings discussed with the patient. She sustained an acute right ankle fracture.  There is mild displacement with widening of the medial clear space.  She has tenderness medially.  She also has a small fracture of the posterior malleolus.  This is unstable and meets operative criteria.  We will proceed with CT scan for further evaluation of the posterior malleolar component. We discussed operative and nonoperative options. The patient had an opportunity to ask questions. All questions were answered. The risks and benefits of surgery were discussed with the patient, including but not limited to bleeding, infection, damage to surrounding nerves and structures, need for further surgery, repair failure, anesthesia risk, progression of arthritis, blood clots, and medical risks of surgery. The patient voiced understanding of the risks and benefits and provided written consent to the procedure. Plan for open reduction and internal fixation of right distal fibula fracture with possible syndesmotic fixation and any indicated procedures. Patient will be scheduled for surgery at a mutually convenient date in the near future.  She was placed back into a well-padded short-leg splint.  She was instructed on nonweightbearing status and strict elevation.  Crutch ambulation.  Pain control. All questions were answered and patient is happy and in agreement with the plan.

## 2023-09-20 ENCOUNTER — ANESTHESIA EVENT (OUTPATIENT)
Dept: SURGERY | Facility: HOSPITAL | Age: 33
End: 2023-09-20
Payer: COMMERCIAL

## 2023-09-20 ENCOUNTER — PATIENT MESSAGE (OUTPATIENT)
Dept: ADMINISTRATIVE | Facility: OTHER | Age: 33
End: 2023-09-20
Payer: COMMERCIAL

## 2023-09-20 RX ORDER — ONDANSETRON 2 MG/ML
4 INJECTION INTRAMUSCULAR; INTRAVENOUS DAILY PRN
Status: CANCELLED | OUTPATIENT
Start: 2023-09-20

## 2023-09-20 RX ORDER — SODIUM CHLORIDE, SODIUM LACTATE, POTASSIUM CHLORIDE, CALCIUM CHLORIDE 600; 310; 30; 20 MG/100ML; MG/100ML; MG/100ML; MG/100ML
1000 INJECTION, SOLUTION INTRAVENOUS ONCE
Status: CANCELLED | OUTPATIENT
Start: 2023-09-20 | End: 2023-09-20

## 2023-09-20 RX ORDER — HYDROCODONE BITARTRATE AND ACETAMINOPHEN 5; 325 MG/1; MG/1
1 TABLET ORAL
Status: CANCELLED | OUTPATIENT
Start: 2023-09-20

## 2023-09-20 RX ORDER — MEPERIDINE HYDROCHLORIDE 25 MG/ML
6.25 INJECTION INTRAMUSCULAR; INTRAVENOUS; SUBCUTANEOUS ONCE AS NEEDED
Status: CANCELLED | OUTPATIENT
Start: 2023-09-20 | End: 2023-09-21

## 2023-09-20 RX ORDER — HYDROMORPHONE HYDROCHLORIDE 2 MG/ML
0.4 INJECTION, SOLUTION INTRAMUSCULAR; INTRAVENOUS; SUBCUTANEOUS EVERY 5 MIN PRN
Status: CANCELLED | OUTPATIENT
Start: 2023-09-20

## 2023-09-20 RX ORDER — PROCHLORPERAZINE EDISYLATE 5 MG/ML
5 INJECTION INTRAMUSCULAR; INTRAVENOUS EVERY 30 MIN PRN
Status: CANCELLED | OUTPATIENT
Start: 2023-09-20

## 2023-09-20 NOTE — ANESTHESIA PREPROCEDURE EVALUATION
09/20/2023  Cha Puckett is a 32 y.o., female with ----------------------------  Anxiety  Borderline personality disorder  Depression  Fracture  Panic disorder (episodic paroxysmal anxiety)    And ----------------------------  Appendectomy  Cosmetic surgery      Comment:  liposuction  Knee surgery      Comment:  x 6  Skin biopsy  Tubal ligation  Tympanostomy tube placement  [place] tooth extraction    Presents for ORIF of Right ankle.      Pre-op Assessment    I have reviewed the NPO Status.      Review of Systems  Anesthesia Hx:  PONV with each anesthetic with or without zofran and scop patch   Cardiovascular:  Functional Capacity pt injury while roller skating with children - excellent functional capacity    Pulmonary:  Denies Pulmonary Symptoms.    Endocrine:  Denies Diabetes    Psych:   Psychiatric History             Anesthesia Plan  Type of Anesthesia, risks & benefits discussed:    Anesthesia Type: Regional, Gen ETT  Intra-op Monitoring Plan: Standard ASA Monitors  Post Op Pain Control Plan: multimodal analgesia, peripheral nerve block and IV/PO Opioids PRN  Induction:  IV  Informed Consent: Informed consent signed with the Patient and all parties understand the risks and agree with anesthesia plan.  All questions answered.   ASA Score: 2  Day of Surgery Review of History & Physical: H&P Update referred to the surgeon/provider.  Anesthesia Plan Notes: Premedication: Midazolam  Regional: Adductor canal and popliteal nerve block for postop pain control as requested by Dr Ramos__- Ropiv 0.5%  and Decadron  Special Technique: Preemptive analgesia with neurontin, zofran, acetaminophen and celebrex or tramadol  GETA   PONV prophylaxis    Ready For Surgery From Anesthesia Perspective.     .

## 2023-09-21 ENCOUNTER — HOSPITAL ENCOUNTER (OUTPATIENT)
Facility: HOSPITAL | Age: 33
Discharge: HOME | End: 2023-09-21
Attending: REHABILITATION UNIT | Admitting: REHABILITATION UNIT
Payer: COMMERCIAL

## 2023-09-21 ENCOUNTER — ANESTHESIA (OUTPATIENT)
Dept: SURGERY | Facility: HOSPITAL | Age: 33
End: 2023-09-21
Payer: COMMERCIAL

## 2023-09-21 VITALS
BODY MASS INDEX: 35.59 KG/M2 | WEIGHT: 234.81 LBS | SYSTOLIC BLOOD PRESSURE: 126 MMHG | OXYGEN SATURATION: 88 % | RESPIRATION RATE: 20 BRPM | HEART RATE: 63 BPM | DIASTOLIC BLOOD PRESSURE: 80 MMHG | TEMPERATURE: 98 F | HEIGHT: 68 IN

## 2023-09-21 DIAGNOSIS — S82.831A CLOSED FRACTURE OF DISTAL END OF RIGHT FIBULA, UNSPECIFIED FRACTURE MORPHOLOGY, INITIAL ENCOUNTER: ICD-10-CM

## 2023-09-21 PROCEDURE — 63600175 PHARM REV CODE 636 W HCPCS: Performed by: NURSE ANESTHETIST, CERTIFIED REGISTERED

## 2023-09-21 PROCEDURE — 63600175 PHARM REV CODE 636 W HCPCS: Performed by: ANESTHESIOLOGY

## 2023-09-21 PROCEDURE — 36000710: Performed by: REHABILITATION UNIT

## 2023-09-21 PROCEDURE — 71000015 HC POSTOP RECOV 1ST HR: Performed by: REHABILITATION UNIT

## 2023-09-21 PROCEDURE — 27201423 OPTIME MED/SURG SUP & DEVICES STERILE SUPPLY: Performed by: REHABILITATION UNIT

## 2023-09-21 PROCEDURE — 36000711: Performed by: REHABILITATION UNIT

## 2023-09-21 PROCEDURE — 25000003 PHARM REV CODE 250: Performed by: ANESTHESIOLOGY

## 2023-09-21 PROCEDURE — 25000003 PHARM REV CODE 250: Performed by: REHABILITATION UNIT

## 2023-09-21 PROCEDURE — 64447 NJX AA&/STRD FEMORAL NRV IMG: CPT | Mod: 59,RT,, | Performed by: ANESTHESIOLOGY

## 2023-09-21 PROCEDURE — 37000009 HC ANESTHESIA EA ADD 15 MINS: Performed by: REHABILITATION UNIT

## 2023-09-21 PROCEDURE — C1769 GUIDE WIRE: HCPCS | Performed by: REHABILITATION UNIT

## 2023-09-21 PROCEDURE — 25000003 PHARM REV CODE 250: Performed by: NURSE ANESTHETIST, CERTIFIED REGISTERED

## 2023-09-21 PROCEDURE — 27792 PR OPEN TX DISTAL FIBULAR FRACTURE LAT MALLEOLUS: ICD-10-PCS | Mod: 51,RT,, | Performed by: REHABILITATION UNIT

## 2023-09-21 PROCEDURE — C1713 ANCHOR/SCREW BN/BN,TIS/BN: HCPCS | Performed by: REHABILITATION UNIT

## 2023-09-21 PROCEDURE — 71000016 HC POSTOP RECOV ADDL HR: Performed by: REHABILITATION UNIT

## 2023-09-21 PROCEDURE — D9220A PRA ANESTHESIA: ICD-10-PCS | Mod: ANES,,, | Performed by: ANESTHESIOLOGY

## 2023-09-21 PROCEDURE — 64450 NJX AA&/STRD OTHER PN/BRANCH: CPT | Mod: 59,RT,, | Performed by: ANESTHESIOLOGY

## 2023-09-21 PROCEDURE — 63600175 PHARM REV CODE 636 W HCPCS

## 2023-09-21 PROCEDURE — 63600175 PHARM REV CODE 636 W HCPCS: Performed by: REHABILITATION UNIT

## 2023-09-21 PROCEDURE — 27829 TREAT LOWER LEG JOINT: CPT | Mod: RT,,, | Performed by: REHABILITATION UNIT

## 2023-09-21 PROCEDURE — A4216 STERILE WATER/SALINE, 10 ML: HCPCS | Performed by: NURSE ANESTHETIST, CERTIFIED REGISTERED

## 2023-09-21 PROCEDURE — 64447 PERIPHERAL BLOCK: ICD-10-PCS | Mod: 59,RT,, | Performed by: ANESTHESIOLOGY

## 2023-09-21 PROCEDURE — 25000003 PHARM REV CODE 250

## 2023-09-21 PROCEDURE — 37000008 HC ANESTHESIA 1ST 15 MINUTES: Performed by: REHABILITATION UNIT

## 2023-09-21 PROCEDURE — 71000033 HC RECOVERY, INTIAL HOUR: Performed by: REHABILITATION UNIT

## 2023-09-21 PROCEDURE — 64450 PERIPHERAL BLOCK: ICD-10-PCS | Mod: 59,RT,, | Performed by: ANESTHESIOLOGY

## 2023-09-21 PROCEDURE — D9220A PRA ANESTHESIA: Mod: CRNA,,, | Performed by: NURSE ANESTHETIST, CERTIFIED REGISTERED

## 2023-09-21 PROCEDURE — 27792 TREATMENT OF ANKLE FRACTURE: CPT | Mod: 51,RT,, | Performed by: REHABILITATION UNIT

## 2023-09-21 PROCEDURE — 64445 NJX AA&/STRD SCIATIC NRV IMG: CPT | Performed by: ANESTHESIOLOGY

## 2023-09-21 PROCEDURE — D9220A PRA ANESTHESIA: ICD-10-PCS | Mod: CRNA,,, | Performed by: NURSE ANESTHETIST, CERTIFIED REGISTERED

## 2023-09-21 PROCEDURE — 27829 PR OPEN TX DISTAL TIBIOFIBULAR JOINT DISRUPTION: ICD-10-PCS | Mod: RT,,, | Performed by: REHABILITATION UNIT

## 2023-09-21 PROCEDURE — D9220A PRA ANESTHESIA: Mod: ANES,,, | Performed by: ANESTHESIOLOGY

## 2023-09-21 DEVICE — PLATE DISTAL FIBULA LOCK 5H R: Type: IMPLANTABLE DEVICE | Site: ANKLE | Status: FUNCTIONAL

## 2023-09-21 DEVICE — SCREW LP LOCKIING SS 2.7X16MM: Type: IMPLANTABLE DEVICE | Site: ANKLE | Status: FUNCTIONAL

## 2023-09-21 DEVICE — SCREW KREULOCK COMP 2.7X18MM: Type: IMPLANTABLE DEVICE | Site: ANKLE | Status: FUNCTIONAL

## 2023-09-21 DEVICE — SCREW LP LOCKIING TM 3.5X14MM: Type: IMPLANTABLE DEVICE | Site: ANKLE | Status: FUNCTIONAL

## 2023-09-21 DEVICE — SCREW LP LOCK CORTICAL 2.7X20: Type: IMPLANTABLE DEVICE | Site: ANKLE | Status: FUNCTIONAL

## 2023-09-21 DEVICE — IMPLANTABLE DEVICE: Type: IMPLANTABLE DEVICE | Site: ANKLE | Status: FUNCTIONAL

## 2023-09-21 DEVICE — K-LESS T-ROPE W/DRV, SYN REPR, SS
Type: IMPLANTABLE DEVICE | Site: ANKLE | Status: FUNCTIONAL
Brand: ARTHREX®

## 2023-09-21 DEVICE — SCREW KREULOCK COMP 2.7X14MM: Type: IMPLANTABLE DEVICE | Site: ANKLE | Status: FUNCTIONAL

## 2023-09-21 RX ORDER — METHOCARBAMOL 500 MG/1
1000 TABLET, FILM COATED ORAL EVERY 6 HOURS PRN
Status: DISCONTINUED | OUTPATIENT
Start: 2023-09-21 | End: 2023-09-21 | Stop reason: HOSPADM

## 2023-09-21 RX ORDER — SODIUM CHLORIDE, SODIUM GLUCONATE, SODIUM ACETATE, POTASSIUM CHLORIDE AND MAGNESIUM CHLORIDE 30; 37; 368; 526; 502 MG/100ML; MG/100ML; MG/100ML; MG/100ML; MG/100ML
INJECTION, SOLUTION INTRAVENOUS CONTINUOUS
Status: ACTIVE | OUTPATIENT
Start: 2023-09-21 | End: 2023-10-21

## 2023-09-21 RX ORDER — MIDAZOLAM HYDROCHLORIDE 1 MG/ML
INJECTION INTRAMUSCULAR; INTRAVENOUS
Status: COMPLETED
Start: 2023-09-21 | End: 2023-09-21

## 2023-09-21 RX ORDER — ROPIVACAINE HYDROCHLORIDE 5 MG/ML
INJECTION, SOLUTION EPIDURAL; INFILTRATION; PERINEURAL
Status: COMPLETED | OUTPATIENT
Start: 2023-09-21 | End: 2023-09-21

## 2023-09-21 RX ORDER — HYDROCODONE BITARTRATE AND ACETAMINOPHEN 5; 325 MG/1; MG/1
1 TABLET ORAL EVERY 4 HOURS PRN
Status: DISCONTINUED | OUTPATIENT
Start: 2023-09-21 | End: 2023-09-21 | Stop reason: HOSPADM

## 2023-09-21 RX ORDER — DEXAMETHASONE SODIUM PHOSPHATE 4 MG/ML
INJECTION, SOLUTION INTRA-ARTICULAR; INTRALESIONAL; INTRAMUSCULAR; INTRAVENOUS; SOFT TISSUE
Status: DISCONTINUED | OUTPATIENT
Start: 2023-09-21 | End: 2023-09-21

## 2023-09-21 RX ORDER — CELECOXIB 200 MG/1
200 CAPSULE ORAL
Status: DISPENSED | OUTPATIENT
Start: 2023-09-21

## 2023-09-21 RX ORDER — SODIUM CHLORIDE 9 MG/ML
INJECTION, SOLUTION INTRAVENOUS CONTINUOUS
Status: DISCONTINUED | OUTPATIENT
Start: 2023-09-21 | End: 2023-09-21 | Stop reason: HOSPADM

## 2023-09-21 RX ORDER — METHOCARBAMOL 100 MG/ML
1000 INJECTION, SOLUTION INTRAMUSCULAR; INTRAVENOUS ONCE
Status: COMPLETED | OUTPATIENT
Start: 2023-09-21 | End: 2023-09-21

## 2023-09-21 RX ORDER — ONDANSETRON 4 MG/1
4 TABLET, ORALLY DISINTEGRATING ORAL
Status: COMPLETED | OUTPATIENT
Start: 2023-09-21 | End: 2023-09-21

## 2023-09-21 RX ORDER — MAG HYDROX/ALUMINUM HYD/SIMETH 200-200-20
30 SUSPENSION, ORAL (FINAL DOSE FORM) ORAL EVERY 6 HOURS PRN
Status: DISCONTINUED | OUTPATIENT
Start: 2023-09-21 | End: 2023-09-21 | Stop reason: HOSPADM

## 2023-09-21 RX ORDER — DEXAMETHASONE SODIUM PHOSPHATE 10 MG/ML
INJECTION INTRAMUSCULAR; INTRAVENOUS
Status: COMPLETED | OUTPATIENT
Start: 2023-09-21 | End: 2023-09-21

## 2023-09-21 RX ORDER — METOCLOPRAMIDE HYDROCHLORIDE 5 MG/ML
10 INJECTION INTRAMUSCULAR; INTRAVENOUS EVERY 6 HOURS PRN
Status: DISCONTINUED | OUTPATIENT
Start: 2023-09-21 | End: 2023-09-21 | Stop reason: HOSPADM

## 2023-09-21 RX ORDER — MIDAZOLAM HYDROCHLORIDE 1 MG/ML
2 INJECTION INTRAMUSCULAR; INTRAVENOUS
Status: DISPENSED | OUTPATIENT
Start: 2023-09-21

## 2023-09-21 RX ORDER — MELOXICAM 7.5 MG/1
TABLET ORAL
Qty: 30 TABLET | Refills: 0 | Status: SHIPPED | OUTPATIENT
Start: 2023-09-27 | End: 2023-10-06

## 2023-09-21 RX ORDER — ONDANSETRON 2 MG/ML
INJECTION INTRAMUSCULAR; INTRAVENOUS
Status: DISCONTINUED
Start: 2023-09-21 | End: 2023-09-21 | Stop reason: HOSPADM

## 2023-09-21 RX ORDER — KETOROLAC TROMETHAMINE 30 MG/ML
INJECTION, SOLUTION INTRAMUSCULAR; INTRAVENOUS
Status: DISCONTINUED | OUTPATIENT
Start: 2023-09-21 | End: 2023-09-21

## 2023-09-21 RX ORDER — OXYCODONE AND ACETAMINOPHEN 5; 325 MG/1; MG/1
1 TABLET ORAL EVERY 6 HOURS PRN
Qty: 15 TABLET | Refills: 0 | Status: SHIPPED | OUTPATIENT
Start: 2023-09-21

## 2023-09-21 RX ORDER — KETOROLAC TROMETHAMINE 10 MG/1
10 TABLET, FILM COATED ORAL EVERY 6 HOURS
Qty: 20 TABLET | Refills: 0 | Status: SHIPPED | OUTPATIENT
Start: 2023-09-21 | End: 2023-09-26

## 2023-09-21 RX ORDER — KETAMINE HYDROCHLORIDE 100 MG/ML
INJECTION, SOLUTION INTRAMUSCULAR; INTRAVENOUS
Status: DISCONTINUED | OUTPATIENT
Start: 2023-09-21 | End: 2023-09-21

## 2023-09-21 RX ORDER — PROPOFOL 10 MG/ML
VIAL (ML) INTRAVENOUS
Status: DISCONTINUED | OUTPATIENT
Start: 2023-09-21 | End: 2023-09-21

## 2023-09-21 RX ORDER — ONDANSETRON 2 MG/ML
4 INJECTION INTRAMUSCULAR; INTRAVENOUS EVERY 6 HOURS PRN
Status: DISCONTINUED | OUTPATIENT
Start: 2023-09-21 | End: 2023-09-21 | Stop reason: HOSPADM

## 2023-09-21 RX ORDER — MIDAZOLAM HYDROCHLORIDE 1 MG/ML
.5-4 INJECTION INTRAMUSCULAR; INTRAVENOUS
Status: ACTIVE | OUTPATIENT
Start: 2023-09-21

## 2023-09-21 RX ORDER — HYDROMORPHONE HYDROCHLORIDE 2 MG/ML
INJECTION, SOLUTION INTRAMUSCULAR; INTRAVENOUS; SUBCUTANEOUS
Status: DISCONTINUED | OUTPATIENT
Start: 2023-09-21 | End: 2023-09-21

## 2023-09-21 RX ORDER — LABETALOL HYDROCHLORIDE 5 MG/ML
INJECTION, SOLUTION INTRAVENOUS
Status: DISCONTINUED | OUTPATIENT
Start: 2023-09-21 | End: 2023-09-21

## 2023-09-21 RX ORDER — METHOCARBAMOL 750 MG/1
750 TABLET, FILM COATED ORAL EVERY 6 HOURS
Qty: 56 TABLET | Refills: 0 | Status: SHIPPED | OUTPATIENT
Start: 2023-09-21 | End: 2023-10-05

## 2023-09-21 RX ORDER — ACETAMINOPHEN 500 MG
1000 TABLET ORAL
Status: DISPENSED | OUTPATIENT
Start: 2023-09-21

## 2023-09-21 RX ORDER — PROCHLORPERAZINE EDISYLATE 5 MG/ML
2.5 INJECTION INTRAMUSCULAR; INTRAVENOUS EVERY 6 HOURS PRN
Status: DISCONTINUED | OUTPATIENT
Start: 2023-09-21 | End: 2023-09-21 | Stop reason: HOSPADM

## 2023-09-21 RX ORDER — GABAPENTIN 300 MG/1
300 CAPSULE ORAL
Status: DISPENSED | OUTPATIENT
Start: 2023-09-21

## 2023-09-21 RX ORDER — LIDOCAINE HYDROCHLORIDE 20 MG/ML
INJECTION, SOLUTION EPIDURAL; INFILTRATION; INTRACAUDAL; PERINEURAL
Status: DISCONTINUED | OUTPATIENT
Start: 2023-09-21 | End: 2023-09-21

## 2023-09-21 RX ORDER — ROPIVACAINE HYDROCHLORIDE 5 MG/ML
30 INJECTION, SOLUTION EPIDURAL; INFILTRATION; PERINEURAL ONCE
Status: DISPENSED | OUTPATIENT
Start: 2023-09-21

## 2023-09-21 RX ORDER — HALOPERIDOL 5 MG/ML
1 INJECTION INTRAMUSCULAR ONCE AS NEEDED
Status: DISCONTINUED | OUTPATIENT
Start: 2023-09-21 | End: 2023-09-21 | Stop reason: HOSPADM

## 2023-09-21 RX ORDER — FENTANYL CITRATE 50 UG/ML
25-200 INJECTION, SOLUTION INTRAMUSCULAR; INTRAVENOUS
Status: ACTIVE | OUTPATIENT
Start: 2023-09-21

## 2023-09-21 RX ORDER — PROPOFOL 10 MG/ML
VIAL (ML) INTRAVENOUS CONTINUOUS PRN
Status: DISCONTINUED | OUTPATIENT
Start: 2023-09-21 | End: 2023-09-21

## 2023-09-21 RX ORDER — ROCURONIUM BROMIDE 10 MG/ML
INJECTION, SOLUTION INTRAVENOUS
Status: DISCONTINUED | OUTPATIENT
Start: 2023-09-21 | End: 2023-09-21

## 2023-09-21 RX ORDER — EPHEDRINE SULFATE 50 MG/ML
INJECTION, SOLUTION INTRAVENOUS
Status: DISCONTINUED | OUTPATIENT
Start: 2023-09-21 | End: 2023-09-21

## 2023-09-21 RX ORDER — SUCCINYLCHOLINE CHLORIDE 20 MG/ML
INJECTION INTRAMUSCULAR; INTRAVENOUS
Status: DISCONTINUED | OUTPATIENT
Start: 2023-09-21 | End: 2023-09-21

## 2023-09-21 RX ORDER — GABAPENTIN 300 MG/1
300 CAPSULE ORAL EVERY 8 HOURS
Qty: 15 CAPSULE | Refills: 0 | Status: SHIPPED | OUTPATIENT
Start: 2023-09-21 | End: 2023-09-26

## 2023-09-21 RX ORDER — DEXAMETHASONE SODIUM PHOSPHATE 4 MG/ML
4 INJECTION, SOLUTION INTRA-ARTICULAR; INTRALESIONAL; INTRAMUSCULAR; INTRAVENOUS; SOFT TISSUE ONCE
Status: DISPENSED | OUTPATIENT
Start: 2023-09-21

## 2023-09-21 RX ORDER — FENTANYL CITRATE 50 UG/ML
INJECTION, SOLUTION INTRAMUSCULAR; INTRAVENOUS
Status: DISCONTINUED | OUTPATIENT
Start: 2023-09-21 | End: 2023-09-21

## 2023-09-21 RX ORDER — GLYCOPYRROLATE 0.2 MG/ML
INJECTION INTRAMUSCULAR; INTRAVENOUS
Status: DISCONTINUED | OUTPATIENT
Start: 2023-09-21 | End: 2023-09-21

## 2023-09-21 RX ORDER — MORPHINE SULFATE 4 MG/ML
3 INJECTION, SOLUTION INTRAMUSCULAR; INTRAVENOUS
Status: DISCONTINUED | OUTPATIENT
Start: 2023-09-21 | End: 2023-09-21 | Stop reason: HOSPADM

## 2023-09-21 RX ADMIN — DEXMEDETOMIDINE HYDROCHLORIDE 0.5 MCG/KG/HR: 100 INJECTION, SOLUTION INTRAVENOUS at 11:09

## 2023-09-21 RX ADMIN — SUCCINYLCHOLINE CHLORIDE 140 MG: 20 INJECTION, SOLUTION INTRAMUSCULAR; INTRAVENOUS at 11:09

## 2023-09-21 RX ADMIN — PROCHLORPERAZINE EDISYLATE 2.5 MG: 5 INJECTION INTRAMUSCULAR; INTRAVENOUS at 02:09

## 2023-09-21 RX ADMIN — METHOCARBAMOL 1000 MG: 100 INJECTION INTRAMUSCULAR; INTRAVENOUS at 01:09

## 2023-09-21 RX ADMIN — ROPIVACAINE HYDROCHLORIDE 20 ML: 5 INJECTION, SOLUTION EPIDURAL; INFILTRATION; PERINEURAL at 11:09

## 2023-09-21 RX ADMIN — DEXAMETHASONE SODIUM PHOSPHATE 4 MG: 10 INJECTION INTRAMUSCULAR; INTRAVENOUS at 11:09

## 2023-09-21 RX ADMIN — SUGAMMADEX 200 MG: 100 INJECTION, SOLUTION INTRAVENOUS at 12:09

## 2023-09-21 RX ADMIN — SODIUM CHLORIDE, SODIUM GLUCONATE, SODIUM ACETATE, POTASSIUM CHLORIDE AND MAGNESIUM CHLORIDE: 526; 502; 368; 37; 30 INJECTION, SOLUTION INTRAVENOUS at 11:09

## 2023-09-21 RX ADMIN — CEFAZOLIN 2 G: 2 INJECTION, POWDER, FOR SOLUTION INTRAMUSCULAR; INTRAVENOUS at 11:09

## 2023-09-21 RX ADMIN — CELECOXIB 200 MG: 200 CAPSULE ORAL at 10:09

## 2023-09-21 RX ADMIN — EPHEDRINE SULFATE 10 MG: 50 INJECTION INTRAVENOUS at 12:09

## 2023-09-21 RX ADMIN — LABETALOL HYDROCHLORIDE 10 MG: 5 INJECTION, SOLUTION INTRAVENOUS at 12:09

## 2023-09-21 RX ADMIN — SODIUM CHLORIDE, SODIUM GLUCONATE, SODIUM ACETATE, POTASSIUM CHLORIDE AND MAGNESIUM CHLORIDE: 526; 502; 368; 37; 30 INJECTION, SOLUTION INTRAVENOUS at 10:09

## 2023-09-21 RX ADMIN — MIDAZOLAM HYDROCHLORIDE 2 MG: 1 INJECTION, SOLUTION INTRAMUSCULAR; INTRAVENOUS at 10:09

## 2023-09-21 RX ADMIN — DEXAMETHASONE SODIUM PHOSPHATE 8 MG: 4 INJECTION, SOLUTION INTRA-ARTICULAR; INTRALESIONAL; INTRAMUSCULAR; INTRAVENOUS; SOFT TISSUE at 11:09

## 2023-09-21 RX ADMIN — PROPOFOL 15 MCG/KG/MIN: 10 INJECTION, EMULSION INTRAVENOUS at 11:09

## 2023-09-21 RX ADMIN — PROPOFOL 20 MG: 10 INJECTION, EMULSION INTRAVENOUS at 12:09

## 2023-09-21 RX ADMIN — FENTANYL CITRATE 100 MCG: 50 INJECTION, SOLUTION INTRAMUSCULAR; INTRAVENOUS at 11:09

## 2023-09-21 RX ADMIN — PROPOFOL 150 MG: 10 INJECTION, EMULSION INTRAVENOUS at 11:09

## 2023-09-21 RX ADMIN — KETOROLAC TROMETHAMINE 30 MG: 30 INJECTION, SOLUTION INTRAMUSCULAR at 12:09

## 2023-09-21 RX ADMIN — GLYCOPYRROLATE 0.1 MG: 0.2 INJECTION INTRAMUSCULAR; INTRAVENOUS at 12:09

## 2023-09-21 RX ADMIN — ONDANSETRON 4 MG: 4 TABLET, ORALLY DISINTEGRATING ORAL at 10:09

## 2023-09-21 RX ADMIN — ACETAMINOPHEN 1000 MG: 500 TABLET ORAL at 10:09

## 2023-09-21 RX ADMIN — HYDROMORPHONE HYDROCHLORIDE 1 MG: 2 INJECTION, SOLUTION INTRAMUSCULAR; INTRAVENOUS; SUBCUTANEOUS at 12:09

## 2023-09-21 RX ADMIN — SODIUM CHLORIDE, SODIUM GLUCONATE, SODIUM ACETATE, POTASSIUM CHLORIDE AND MAGNESIUM CHLORIDE: 526; 502; 368; 37; 30 INJECTION, SOLUTION INTRAVENOUS at 12:09

## 2023-09-21 RX ADMIN — LIDOCAINE HYDROCHLORIDE 50 MG: 20 INJECTION, SOLUTION EPIDURAL; INFILTRATION; INTRACAUDAL; PERINEURAL at 11:09

## 2023-09-21 RX ADMIN — GLYCOPYRROLATE 0.2 MG: 0.2 INJECTION INTRAMUSCULAR; INTRAVENOUS at 11:09

## 2023-09-21 RX ADMIN — PROPOFOL 30 MG: 10 INJECTION, EMULSION INTRAVENOUS at 12:09

## 2023-09-21 RX ADMIN — KETAMINE HYDROCHLORIDE 50 MG: 100 INJECTION, SOLUTION INTRAMUSCULAR; INTRAVENOUS at 11:09

## 2023-09-21 RX ADMIN — GABAPENTIN 300 MG: 300 CAPSULE ORAL at 10:09

## 2023-09-21 RX ADMIN — ROCURONIUM BROMIDE 100 MG: 10 SOLUTION INTRAVENOUS at 11:09

## 2023-09-21 NOTE — OR NURSING
10:53  TIMEOUT DONE    11:01  DR. DELACRUZ WILL ATTEMPT TO DO A RIGHT ADDUCTOR CANAL NERVE BLOCK.    11:02  BLOCK COMPLETED AND TOLERATED WELL.    11:06  DR. DELACRUZ WILL ATTEMPT TO DO A RIGHT POPLITEAL NERVE BLOCK.    11:07  BLOCK COMPLETED AND TOLERATED WELL.

## 2023-09-21 NOTE — TRANSFER OF CARE
"Anesthesia Transfer of Care Note    Patient: Cha Puckett    Procedure(s) Performed: Procedure(s) (LRB):  FIXATION, SYNDESMOSIS, ANKLE (Right)  ORIF, FRACTURE, TIBIA OR FIBULA (Right)    Patient location: PACU    Anesthesia Type: general    Transport from OR: Transported from OR on room air with adequate spontaneous ventilation    Post pain: adequate analgesia    Post assessment: no apparent anesthetic complications    Post vital signs: stable    Level of consciousness: awake and alert    Nausea/Vomiting: no nausea/vomiting    Complications: none    Transfer of care protocol was followed      Last vitals:   Visit Vitals  BP (!) 140/77 (BP Location: Right arm, Patient Position: Lying)   Pulse 88   Temp 36.5 °C (97.7 °F) (Skin)   Resp 15   Ht 5' 8" (1.727 m)   Wt 106.5 kg (234 lb 12.6 oz)   LMP 08/22/2023   SpO2 99%   Breastfeeding No   BMI 35.70 kg/m²     "

## 2023-09-21 NOTE — ANESTHESIA PROCEDURE NOTES
Intubation    Date/Time: 9/21/2023 11:22 AM    Performed by: Julio C Tabares CRNA  Authorized by: Marielos Barrera MD    Intubation:     Induction:  Rapid sequence induction    Intubated:  Postinduction    Mask Ventilation:  Not attempted    Attempts:  1    Attempted By:  Student    Method of Intubation:  Direct    Blade:  [place] 2    Laryngeal View Grade: Grade I - full view of cords      Difficult Airway Encountered?: No      Complications:  None    Airway Device:  Oral endotracheal tube    Airway Device Size:  7.5    Style/Cuff Inflation:  Cuffed    Inflation Amount (mL):  7    Tube secured:  22    Secured at:  The lips    Placement Verified By:  Capnometry    Complicating Factors:  None    Findings Post-Intubation:  BS equal bilateral and atraumatic/condition of teeth unchanged

## 2023-09-21 NOTE — DISCHARGE SUMMARY
[facility] - Periop Services  Discharge Note  Short Stay    Procedure(s) (LRB):  Open reduction internal fixation of right distal fibula fracture fracture  Syndesmotic fixation   Non-instrumented treatment of posterior malleolus fracture      OUTCOME: Patient tolerated treatment/procedure well without complication and is now ready for discharge.    DISPOSITION: Home or Self Care    FINAL DIAGNOSIS:    Right distal fibula fracture and syndesmotic disruption, posterior malleolus fracture     FOLLOWUP: In clinic    DISCHARGE INSTRUCTIONS:    Discharge Procedure Orders   Diet general   Order Comments: As prior to surgery     Keep surgical extremity elevated     Ice to affected area     Lifting restrictions     No driving, operating heavy equipment or signing legal documents while taking pain medication     Other restrictions (specify):   Order Comments: Weight Bearing:   ROM:     Call MD for:  temperature >100.4     Call MD for:  persistent nausea and vomiting     Call MD for:  severe uncontrolled pain     Call MD for:  difficulty breathing, headache or visual disturbances     Call MD for:  redness, tenderness, or signs of infection (pain, swelling, redness, odor or green/yellow discharge around incision site)     Call MD for:  hives     Call MD for:  persistent dizziness or light-headedness     Call MD for:  extreme fatigue     Sponge bath only until clinic visit     Leave dressing on - Keep it clean, dry, and intact until clinic visit     Activity as tolerated     Non weight bearing        TIME SPENT ON DISCHARGE: 10 minutes

## 2023-09-21 NOTE — ANESTHESIA POSTPROCEDURE EVALUATION
Anesthesia Post Evaluation    Patient: Cha Puckett    Procedure(s) Performed: Procedure(s) (LRB):  FIXATION, SYNDESMOSIS, ANKLE (Right)  ORIF, FRACTURE, TIBIA OR FIBULA (Right)    Final Anesthesia Type: general      Patient location during evaluation: PACU  Patient participation: Yes- Able to Participate  Level of consciousness: awake and alert  Post-procedure vital signs: reviewed and stable  Pain management: adequate (block working well - no pain or nausea in pacu)  Airway patency: patent    PONV status at discharge: No PONV  Anesthetic complications: no      Cardiovascular status: hemodynamically stable  Respiratory status: unassisted  Hydration status: euvolemic  Follow-up not needed.          Vitals Value Taken Time   /80 09/21/23 1500   Temp 36.5 °C (97.7 °F) 09/21/23 1303   Pulse 63 09/21/23 1500   Resp 20 09/21/23 1500   SpO2 98 % 09/21/23 1500         Event Time   Out of Recovery 13:32:00         Pain/Adi Score: Pain Rating Prior to Med Admin: 0 (9/21/2023 10:34 AM)  Adi Score: 10 (9/21/2023  3:47 PM)  Modified Adi Score: 19 (9/21/2023  3:47 PM)

## 2023-09-21 NOTE — DISCHARGE INSTRUCTIONS
Postoperative Instructions         DIET    Begin with clear liquids and light foods (jello, soups, etc.)    Progress to your normal diet if you are not nauseated    WOUND CARE    Maintain your splint and keep it clean and dry. Loosen bandage if swelling of the toes occur. Elevate foot and ankle to the level of the heart to help reduce swelling    It is normal for there to be bleeding and swelling following surgery. Reinforce with additional dressing as needed.    Keep splint clean, dry, and intact until follow up    MEDICATIONS    Regional nerve block was performed and will be temporary. Patients commonly encounter more pain on the first or second day after surgery when swelling peaks    Most patients will require some narcotic pain medication for a short period of time - this can be taken as directed on the bottle    Common side effects of pain medication are nausea, drowsiness, and constipation. To decrease the side effects take the medication with food. We recommend a stool softener such as Colace (docusate) available over the counter and be sure to drink plenty of water.    If you are having problems with nausea and vomiting, contact the office to possibly have your medications changed    Do not drive a car or operate machinery while taking the narcotic medication    Please avoid alcohol use while taking narcotic pain medication    Please take all medications as directed on the bottle.    ACTIVITY    Elevate the operative extremity to chest level whenever possible to decrease swelling    Non-weightbearing ambulation with crutches    EMERGENCIES    Contact Dr. Soriano office at [phone] if any of the following are present:    ·         Painful swelling or numbness (note that some swelling and numbness is normal)    ·         Unrelenting pain or calf pain    ·         Fever (over 101F - it is normal to have a low grade fever (<100F) for the first day or two following surgery) or chills    ·         Redness  around incisions    ·         Color change of toes    ·         Continuous drainage or bleeding from incision (a small amount of drainage is expected)    ·         Difficulty breathing    ·         Excessive nausea/vomiting    If you have an emergency that requires immediate attention proceed to the nearest emergency room    FOLLOW UP    Your first follow up will be in two weeks.         If you have any further questions please contact Dr. Soriano office

## 2023-09-21 NOTE — ANESTHESIA PROCEDURE NOTES
Peripheral Block    Patient location during procedure: pre-op   Block not for primary anesthetic.  Reason for block: at surgeon's request and post-op pain management   Post-op Pain Location: Right Ankle   Start time: 9/21/2023 11:06 AM  Timeout: 9/21/2023 11:00 AM   End time: 9/21/2023 11:07 AM    Staffing  Authorizing Provider: Marielos Barrera MD  Performing Provider: Marielos Barrera MD    Staffing  Performed by: Marielos Barrera MD  Authorized by: Marielos Barrera MD    Preanesthetic Checklist  Completed: patient identified, IV checked, site marked, risks and benefits discussed, surgical consent, monitors and equipment checked, pre-op evaluation and timeout performed  Peripheral Block  Patient position: prone  Prep: ChloraPrep and site prepped and draped  Patient monitoring: heart rate, cardiac monitor, continuous pulse ox and frequent blood pressure checks  Block type: popliteal  Laterality: right  Injection technique: single shot  Needle  Needle type: Stimuplex   Needle gauge: 20 G  Needle length: 2 in  Needle localization: anatomical landmarks and ultrasound guidance   -ultrasound image captured on disc.  Assessment  Injection assessment: negative aspiration, negative parasthesia and local visualized surrounding nerve  Paresthesia pain: none  Heart rate change: no  Slow fractionated injection: yes  Pain Tolerance: comfortable throughout block and no complaints  Medications:    Medications: ropivacaine (NAROPIN) injection 0.5% - Perineural   20 mL - 9/21/2023 11:07:00 AM  dexAMETHasone sodium phos (PF) injection 10 mg/mL - Other   4 mg - 9/21/2023 11:07:00 AM

## 2023-09-21 NOTE — ANESTHESIA PROCEDURE NOTES
Peripheral Block    Patient location during procedure: pre-op   Block not for primary anesthetic.  Reason for block: at surgeon's request and post-op pain management   Post-op Pain Location: right ankle   Start time: 9/21/2023 11:01 AM  Timeout: 9/21/2023 11:00 AM   End time: 9/21/2023 11:02 AM    Staffing  Authorizing Provider: Marielos Barrera MD  Performing Provider: Marielos Barrera MD    Staffing  Performed by: Marielos Barrera MD  Authorized by: Marielos Barrera MD    Preanesthetic Checklist  Completed: patient identified, IV checked, site marked, risks and benefits discussed, surgical consent, monitors and equipment checked, pre-op evaluation and timeout performed  Peripheral Block  Patient position: supine  Prep: ChloraPrep  Patient monitoring: heart rate, cardiac monitor, continuous pulse ox, continuous capnometry and frequent blood pressure checks  Block type: adductor canal  Laterality: right  Injection technique: single shot  Needle  Needle type: Stimuplex   Needle gauge: 20 G  Needle length: 4 in  Needle localization: ultrasound guidance and anatomical landmarks   -ultrasound image captured on disc.  Assessment  Injection assessment: negative aspiration, negative parasthesia and local visualized surrounding nerve  Paresthesia pain: none  Heart rate change: no  Slow fractionated injection: yes  Pain Tolerance: comfortable throughout block and no complaints  Medications:    Medications: ropivacaine (NAROPIN) injection 0.5% - Perineural   20 mL - 9/21/2023 11:02:00 AM  dexAMETHasone sodium phos (PF) injection 10 mg/mL - Other   4 mg - 9/21/2023 11:02:00 AM    Additional Notes  VSS.  RN monitoring vitals throughout procedure.  Patient tolerated procedure well.    Ultrasound guidance used to visualize the nerve bundle and sheath as well as confirm needle placement and deposition of the local anesthetic.  (1) Under ultrasound guidance, needle was inserted and placed in close proximity to the nerve  bundle  (2) Ultrasound was also used to visualize the spread of the anesthetic in close proximity to the femoral artery  (3) The nerves appeared anatomically normal  (4) There were no apparent abnormal pathological findings    Ultrasound photos archived.  Pt tolerated procedure well. There were no immediate complications.

## 2023-09-21 NOTE — OP NOTE
Operative report     Date of Operative Procedure: 23              Location: [facility] OR      Name: Cha Puckett   : [birth year] MRN: [id number]      Preoperative diagnosis: Right distal fibula fracture and syndesmotic disruption, posterior malleolus fracture      Postoperative diagnosis: Right distal fibula fracture and syndesmotic disruption, posterior malleolus fracture       Procedure:   Open reduction internal fixation of right distal fibula fracture fracture  Syndesmotic fixation   Non-instrumented treatment of posterior malleolus fracture      Attending Surgeon:   Byron Fraser MD     Anesthesia: General with regional nerve block                        Antibiotics: Ancef 2g     Estimated Blood Loss: less than 10cc     Specimen: none      Complications: None     Tourniquet time: see flowsheet      Implants: Arthrex right 5 hole locking distal fibula plate and tightrope      Implant Name Type Inv. Item Serial No.  Lot No. LRB No. Used Action   KIT KNTLS T-ROPE SYNDSMOS DRVR - [id number]  KIT KNTLS T-ROPE SYNDSMOS DRVR  [employer] [id number] Right 1 Implanted   GUIDEWIRE TROCAR TIP.062 - [id number]  GUIDEWIRE TROCAR TIP.062  [employer]  Right 1 Implanted and Explanted   SCREW LP LOCKIING TM 3.5X14MM - [id number]  SCREW LP LOCKIING TM 3.5X14MM  [employer]  Right 3 Implanted   SCREW KREULOCK COMP 2.7X18MM - [id number]  SCREW KREULOCK COMP 2.7X18MM  [employer]  Right 2 Implanted   SCREW KREULOCK COMP 2.7X14MM - [id number]  SCREW KREULOCK COMP 2.7X14MM  [employer]  Right 1 Implanted   2.7 kreulock    [employer]  Right 1 Implanted   PLATE DISTAL FIBULA LOCK 5H R - [id number]  PLATE DISTAL FIBULA LOCK 5H R  [employer]  Right 1 Implanted   SCREW LP LOCKIING SS 2.7X16MM - [id number]  SCREW LP LOCKIING SS 2.7X16MM  [employer]  Right 1 Implanted   SCREW LP N TC DIANA 2.7X18MM - [id number]  SCREW LP N TC DIANA 2.7X18MM  [employer]  Right 1 Implanted and Explanted   SCREW LP LOCK CORTICAL 2.7X20 - [id number]  SCREW LP LOCK CORTICAL  2.7X20  [employer]X  Right 1 Implanted   2.7mm cortical    [employer]  Right 1 Implanted        Indications for surgery:   Patient is a 32-year-old female who was involved in a roller skating accident on 9/18/23 and sustained the above-noted ankle fracture.  The patient was initially seen in the ED and placed into a splint to stabilize the ankle, improve pain, maintain alignment, and allow the soft tissue to be amenable for definitive ORIF. The patient was seen in clinic and radiographs showed widening of the medial clear space and physical exam revealed medial ankle tenderness to palpation. The risks, benefits, and complications of surgical intervention were discussed as was the natural history of nonoperative treatment. These included but are not limited to: bleeding, infection, damage to surrounding nerves and structures, repair failure, malunion, nonunion, need for additional procedures, development or progression of arthritis, continued pain, stiffness, instability, blood clots, and the medical risks of anesthesia.  Informed consent was signed by the patient. She was placed into a well padded short leg splint and instructed to elevate her extremity and maintain non-weightbearing status.       Procedure Details:  The correct patient was met in the preoperative holding area where verbal and written informed consent were reobtained and confirmed from the patient.  The operative extremity was marked with an indelible ink marker. Anesthesia performed a peripheral nerve block in the preop holding area.         The patient was then taken to the operative suite and placed supine on a standard table.  Anesthesia was induced and preoperative antibiotics were administered.  All bony prominences were well-padded. The patient was then prepped and draped in the usual sterile fashion.  Prior to commencement of the procedure the universal precautions timeout was performed identifying the correct patient, site, side, and operative  procedure to be performed. All were in agreement and there were no concerns for moving forward.  An Esmarch bandage was used to exsanguinate the limb and the tourniquet was inflated.      Attention was first turned to the surgical approach to the distal fibula fracture.  A lateral approach was used.  Skin was incised and soft tissue dissection was carried down to the level of the fracture site.  Care was taken to not damage the superficial peroneal nerve throughout the case.  Direct reduction techniques were used to obtain an anatomic reduction.  Fracture site was amenable to lag screw placement. Two lag screws were placed. When planning for lateral plating, I chose a plate that allowed for appropriate fixation in both the proximal and distal segments and also allowed for appropriate placement of syndesmotic fixation.  Fluoroscopic views confirmed appropriate positioning. Cortical screws were placed proximally in the shaft and a mix of cortical and locking screws were placed in the distal segment. Stressing of the ankle revealed widening of the medial clear space confirming syndesmotic injury. Distal lag screw had to be removed for placement. An Arthrex syndesmosis tight rope was placed through the plate.  Manual reduction techniques were used to assist in aiding the reduction of the fibula within the incisura. Tibiotalar station was appropriate following fixation.  The ankle was again stressed with no excessive medial clear space widening or talar tilt on stress examination. Posterior stress was applied to stress the posterior malleolus fracture. This was stable requiring no additional fixation. Final fluoroscopic images were obtained.       The wound was copiously irrigated with normal saline.  The incision was closed in layered fashion with 0 vicryl on the fascia, 2-0 monocryl and then a staples. Steri strips, xeroform and then sterile dressings were applied.  All the counts were correct x2.  A well-padded short  leg splint was placed with the ankle and foot in neutral position.      Postoperative plan includes elevation and nonweightbearing mobilization.  The patient will maintain a splint until follow-up in 2 weeks for wound check.  Patient will be given appropriate pain medication.

## 2023-10-09 ENCOUNTER — OFFICE VISIT (OUTPATIENT)
Dept: ORTHOPEDICS | Facility: CLINIC | Age: 33
End: 2023-10-09
Payer: COMMERCIAL

## 2023-10-09 VITALS
HEIGHT: 68 IN | DIASTOLIC BLOOD PRESSURE: 92 MMHG | BODY MASS INDEX: 35.7 KG/M2 | SYSTOLIC BLOOD PRESSURE: 145 MMHG | HEART RATE: 91 BPM

## 2023-10-09 DIAGNOSIS — S82.831D CLOSED FRACTURE OF DISTAL END OF RIGHT FIBULA WITH ROUTINE HEALING, UNSPECIFIED FRACTURE MORPHOLOGY, SUBSEQUENT ENCOUNTER: Primary | ICD-10-CM

## 2023-10-09 PROCEDURE — 3077F SYST BP >= 140 MM HG: CPT | Mod: CPTII,,, | Performed by: REHABILITATION UNIT

## 2023-10-09 PROCEDURE — 99024 POSTOP FOLLOW-UP VISIT: CPT | Mod: ,,, | Performed by: REHABILITATION UNIT

## 2023-10-09 PROCEDURE — 3080F DIAST BP >= 90 MM HG: CPT | Mod: CPTII,,, | Performed by: REHABILITATION UNIT

## 2023-10-09 PROCEDURE — 3077F PR MOST RECENT SYSTOLIC BLOOD PRESSURE >= 140 MM HG: ICD-10-PCS | Mod: CPTII,,, | Performed by: REHABILITATION UNIT

## 2023-10-09 PROCEDURE — 3080F PR MOST RECENT DIASTOLIC BLOOD PRESSURE >= 90 MM HG: ICD-10-PCS | Mod: CPTII,,, | Performed by: REHABILITATION UNIT

## 2023-10-09 PROCEDURE — 1159F MED LIST DOCD IN RCRD: CPT | Mod: CPTII,,, | Performed by: REHABILITATION UNIT

## 2023-10-09 PROCEDURE — 1159F PR MEDICATION LIST DOCUMENTED IN MEDICAL RECORD: ICD-10-PCS | Mod: CPTII,,, | Performed by: REHABILITATION UNIT

## 2023-10-09 PROCEDURE — 99024 PR POST-OP FOLLOW-UP VISIT: ICD-10-PCS | Mod: ,,, | Performed by: REHABILITATION UNIT

## 2023-10-09 NOTE — PROGRESS NOTES
Subjective:      Patient ID: Cha Puckett is a 32 y.o. female.    Chief Complaint: Post-op Evaluation (Right ankle w/ Pos synd SX 9/21/23 GL 12/20/23, patient is doing great and no swelling)    Date of procedure: 9/21/23     Procedure:  Open reduction internal fixation of right distal fibula fracture fracture  Syndesmotic fixation   Non-instrumented treatment of posterior malleolus fracture      Cha Puckett returns to the clinic today for post-operative examination. Patient is s/p the above procedure. Pain is controlled. Doing well postoperatively.  Denies any wound issues.  No fevers, chills, or night sweats.  Patient has been compliant with postop splint.  Patient has crutches here today.  No sensory or motor changes reported.  Patient has not started physical therapy. No other complaints at this time.       Comprehensive review of systems completed and negative except as per HPI.  Objective:     Vitals:    10/09/23 0828   BP: (!) 145/92   Pulse: 91       Gen: No acute distress    RLE:  Incision healing well without signs of infection or dehiscence. Staples in place. Minimal swelling normal for postoperative timeframe.   Range of motion 0 to 25 degrees.    Well-perfused lower extremity with capillary refill less than 2 seconds  Sensation intact to light touch to tibial nerve, superficial and deep peroneal distributions.  3 out of 5 EHL/FHL, tibials anterior, and gastrocsoleus complex  Calf soft and easily compressible without clinical sign of DVT. No palpable popliteal lymphadenopathy.  No pain out of proportion to expectation. No excessive pain with passive stretch of muscles in any compartment. Temperature and color of extremity appropriate. Brisk capillary refill of digits. Compartments compressible without evidence of excessive firmness.     Imaging:    None today    Assessment:       Encounter Diagnosis   Name Primary?    Closed fracture of distal end of right fibula with routine healing, unspecified  fracture morphology, subsequent encounter Yes         Plan:       Cha was seen today for post-op evaluation.    Diagnoses and all orders for this visit:    Closed fracture of distal end of right fibula with routine healing, unspecified fracture morphology, subsequent encounter      Status post the above-stated procedure and doing well.   Staples removed  Pain medications as needed. Ice encouraged. Risk of medications were discussed.   Continue anticoagulation.   Start PT - [facility]. Compliance with protocol and home exercises encouraged.   NWB. She was fit for a walking boot today  All of the patient's questions and concerns were addressed during the visit. Patient will call or contact our office with any new issues or concerns.    Follow-up: Cha Puckett to follow up in 4 weeks with XR. If there are any questions prior to this, the patient was instructed to contact the office.

## 2023-10-26 ENCOUNTER — TELEPHONE (OUTPATIENT)
Dept: ORTHOPEDICS | Facility: CLINIC | Age: 33
End: 2023-10-26
Payer: COMMERCIAL

## 2023-10-26 NOTE — TELEPHONE ENCOUNTER
called to inquire if [facility] paperwork was completed.    I spoke to Pt's  and informed him it is completed and is being faxed. He requested that we email a copy to him at [email or web address].    I informed Pt's  I would do and asked if he could respond letting us know he received it.    Pt's  verbalized understanding and will call with any questions or concerns.    Jeremias, please ensure the paperwork is scanned into media. Thank you!

## 2023-11-13 ENCOUNTER — OFFICE VISIT (OUTPATIENT)
Dept: ORTHOPEDICS | Facility: CLINIC | Age: 33
End: 2023-11-13
Payer: COMMERCIAL

## 2023-11-13 ENCOUNTER — HOSPITAL ENCOUNTER (OUTPATIENT)
Dept: RADIOLOGY | Facility: CLINIC | Age: 33
Discharge: HOME | End: 2023-11-13
Attending: REHABILITATION UNIT
Payer: COMMERCIAL

## 2023-11-13 VITALS
HEART RATE: 76 BPM | BODY MASS INDEX: 33.1 KG/M2 | DIASTOLIC BLOOD PRESSURE: 94 MMHG | SYSTOLIC BLOOD PRESSURE: 146 MMHG | HEIGHT: 68 IN | WEIGHT: 218.38 LBS

## 2023-11-13 DIAGNOSIS — S82.831D CLOSED FRACTURE OF DISTAL END OF RIGHT FIBULA WITH ROUTINE HEALING, UNSPECIFIED FRACTURE MORPHOLOGY, SUBSEQUENT ENCOUNTER: Primary | ICD-10-CM

## 2023-11-13 DIAGNOSIS — S82.831D CLOSED FRACTURE OF DISTAL END OF RIGHT FIBULA WITH ROUTINE HEALING, UNSPECIFIED FRACTURE MORPHOLOGY, SUBSEQUENT ENCOUNTER: ICD-10-CM

## 2023-11-13 PROCEDURE — 3080F PR MOST RECENT DIASTOLIC BLOOD PRESSURE >= 90 MM HG: ICD-10-PCS | Mod: CPTII,,, | Performed by: REHABILITATION UNIT

## 2023-11-13 PROCEDURE — 99024 POSTOP FOLLOW-UP VISIT: CPT | Mod: ,,, | Performed by: REHABILITATION UNIT

## 2023-11-13 PROCEDURE — 3077F PR MOST RECENT SYSTOLIC BLOOD PRESSURE >= 140 MM HG: ICD-10-PCS | Mod: CPTII,,, | Performed by: REHABILITATION UNIT

## 2023-11-13 PROCEDURE — 73610 X-RAY EXAM OF ANKLE: CPT | Mod: RT,,, | Performed by: REHABILITATION UNIT

## 2023-11-13 PROCEDURE — 99024 PR POST-OP FOLLOW-UP VISIT: ICD-10-PCS | Mod: ,,, | Performed by: REHABILITATION UNIT

## 2023-11-13 PROCEDURE — 73610 XR ANKLE COMPLETE 3 VIEW RIGHT: ICD-10-PCS | Mod: RT,,, | Performed by: REHABILITATION UNIT

## 2023-11-13 PROCEDURE — 1159F PR MEDICATION LIST DOCUMENTED IN MEDICAL RECORD: ICD-10-PCS | Mod: CPTII,,, | Performed by: REHABILITATION UNIT

## 2023-11-13 PROCEDURE — 3080F DIAST BP >= 90 MM HG: CPT | Mod: CPTII,,, | Performed by: REHABILITATION UNIT

## 2023-11-13 PROCEDURE — 3077F SYST BP >= 140 MM HG: CPT | Mod: CPTII,,, | Performed by: REHABILITATION UNIT

## 2023-11-13 PROCEDURE — 1159F MED LIST DOCD IN RCRD: CPT | Mod: CPTII,,, | Performed by: REHABILITATION UNIT

## 2024-01-03 ENCOUNTER — OFFICE VISIT (OUTPATIENT)
Dept: ORTHOPEDICS | Facility: CLINIC | Age: 34
End: 2024-01-03
Payer: COMMERCIAL

## 2024-01-03 ENCOUNTER — HOSPITAL ENCOUNTER (OUTPATIENT)
Dept: RADIOLOGY | Facility: CLINIC | Age: 34
Discharge: HOME | End: 2024-01-03
Attending: REHABILITATION UNIT
Payer: COMMERCIAL

## 2024-01-03 VITALS
HEIGHT: 68 IN | SYSTOLIC BLOOD PRESSURE: 146 MMHG | BODY MASS INDEX: 33.21 KG/M2 | DIASTOLIC BLOOD PRESSURE: 97 MMHG | HEART RATE: 63 BPM

## 2024-01-03 DIAGNOSIS — S82.831D CLOSED FRACTURE OF DISTAL END OF RIGHT FIBULA WITH ROUTINE HEALING, UNSPECIFIED FRACTURE MORPHOLOGY, SUBSEQUENT ENCOUNTER: Primary | ICD-10-CM

## 2024-01-03 DIAGNOSIS — S82.831D CLOSED FRACTURE OF DISTAL END OF RIGHT FIBULA WITH ROUTINE HEALING, UNSPECIFIED FRACTURE MORPHOLOGY, SUBSEQUENT ENCOUNTER: ICD-10-CM

## 2024-01-03 PROCEDURE — 3008F BODY MASS INDEX DOCD: CPT | Mod: CPTII,,, | Performed by: REHABILITATION UNIT

## 2024-01-03 PROCEDURE — 99213 OFFICE O/P EST LOW 20 MIN: CPT | Mod: ,,, | Performed by: REHABILITATION UNIT

## 2024-01-03 PROCEDURE — 3080F DIAST BP >= 90 MM HG: CPT | Mod: CPTII,,, | Performed by: REHABILITATION UNIT

## 2024-01-03 PROCEDURE — 3077F SYST BP >= 140 MM HG: CPT | Mod: CPTII,,, | Performed by: REHABILITATION UNIT

## 2024-01-03 PROCEDURE — 73610 X-RAY EXAM OF ANKLE: CPT | Mod: RT,,, | Performed by: REHABILITATION UNIT

## 2024-01-03 PROCEDURE — 1160F RVW MEDS BY RX/DR IN RCRD: CPT | Mod: CPTII,,, | Performed by: REHABILITATION UNIT

## 2024-01-03 PROCEDURE — 1159F MED LIST DOCD IN RCRD: CPT | Mod: CPTII,,, | Performed by: REHABILITATION UNIT

## 2024-01-03 NOTE — PROGRESS NOTES
Subjective:      Patient ID: Cha Puckett is a 33 y.o. female.    Chief Complaint: Follow-up (ORIF of the Right ankle w/ pos synd SX 9/21/23 GL 12/20/23, doing great )    Date of procedure: 9/21/23     Procedure:  Open reduction internal fixation of right distal fibula fracture fracture  Syndesmotic fixation   Non-instrumented treatment of posterior malleolus fracture      Cha Puckett returns to the clinic today for post-operative examination. Patient is s/p the above procedure. Pain is controlled. No fevers, chills, or night sweats. No sensory or motor changes reported.   She was transitioned to physical therapy once a week.  She states she is still having some swelling some discomfort over the lateral aspect of her ankle and reports some achiness to her Achilles.  She is weaned from bracing.  She is not ambulating with any assistive devices.  Her pain is well-controlled.  No other complaints at this time.     Comprehensive review of systems completed and negative except as per HPI.  Objective:     There were no vitals filed for this visit.      Gen: No acute distress    RLE:  Incision well healed without signs of infection or dehiscence. Minimal swelling normal for postoperative timeframe.  Mild tenderness to palpation over the peroneals   Range of motion is full at this time.     Well-perfused lower extremity with capillary refill less than 2 seconds  Sensation intact to light touch to tibial nerve, superficial and deep peroneal distributions.  5 out of 5 EHL/FHL, tibials anterior, and gastrocsoleus complex  Calf soft and easily compressible without clinical sign of DVT. No palpable popliteal lymphadenopathy.  No pain out of proportion to expectation. No excessive pain with passive stretch of muscles in any compartment. Temperature and color of extremity appropriate. Brisk capillary refill of digits. Compartments compressible without evidence of excessive firmness.     Imaging:    3 view XR R ankle obtained  today show postop changes with hardware in appropriate position.     Assessment:       Encounter Diagnosis   Name Primary?    Closed fracture of distal end of right fibula with routine healing, unspecified fracture morphology, subsequent encounter Yes         Plan:       Cha was seen today for follow-up.    Diagnoses and all orders for this visit:    Closed fracture of distal end of right fibula with routine healing, unspecified fracture morphology, subsequent encounter      Status post the above-stated procedure and doing well.   OTC pain medications as needed. Discussed the use of OTC topical volatern gel.  Ice encouraged. Risk of medications were discussed.   Continue PT - [facility]. Compliance with protocol and home exercises encouraged.  Transition to once weekly therapy.   Will see her back in 6 weeks.  We did discuss her returning to work.  I think she is capable of returning to work with frequent breaks to allow for elevating and rest as needed, avoiding high impact activities.  All of the patient's questions and concerns were addressed during the visit. Patient will call or contact our office with any new issues or concerns.    Byron Fraser MD personally performed the services described in this documentation, including but not limited to patient's history, physical examination, and assessment and plan of care. All medical record entries made by CHUY Salas were performed at his direction and in his presence. The medical record was reviewed and is accurate and complete.     Follow-up: Cha Puckett to follow up in 6 weeks with XR. If there are any questions prior to this, the patient was instructed to contact the office.

## 2024-02-08 ENCOUNTER — TELEPHONE (OUTPATIENT)
Dept: ORTHOPEDICS | Facility: CLINIC | Age: 34
End: 2024-02-08
Payer: COMMERCIAL

## 2024-02-19 ENCOUNTER — OFFICE VISIT (OUTPATIENT)
Dept: ORTHOPEDICS | Facility: CLINIC | Age: 34
End: 2024-02-19
Payer: COMMERCIAL

## 2024-02-19 ENCOUNTER — HOSPITAL ENCOUNTER (OUTPATIENT)
Dept: RADIOLOGY | Facility: CLINIC | Age: 34
Discharge: HOME | End: 2024-02-19
Attending: REHABILITATION UNIT
Payer: COMMERCIAL

## 2024-02-19 VITALS
WEIGHT: 218 LBS | HEIGHT: 68 IN | SYSTOLIC BLOOD PRESSURE: 172 MMHG | BODY MASS INDEX: 33.04 KG/M2 | DIASTOLIC BLOOD PRESSURE: 98 MMHG

## 2024-02-19 DIAGNOSIS — S82.831D CLOSED FRACTURE OF DISTAL END OF RIGHT FIBULA WITH ROUTINE HEALING, UNSPECIFIED FRACTURE MORPHOLOGY, SUBSEQUENT ENCOUNTER: ICD-10-CM

## 2024-02-19 DIAGNOSIS — S82.831D CLOSED FRACTURE OF DISTAL END OF RIGHT FIBULA WITH ROUTINE HEALING, UNSPECIFIED FRACTURE MORPHOLOGY, SUBSEQUENT ENCOUNTER: Primary | ICD-10-CM

## 2024-02-19 PROCEDURE — 99213 OFFICE O/P EST LOW 20 MIN: CPT | Mod: ,,, | Performed by: REHABILITATION UNIT

## 2024-02-19 PROCEDURE — 3080F DIAST BP >= 90 MM HG: CPT | Mod: CPTII,,, | Performed by: REHABILITATION UNIT

## 2024-02-19 PROCEDURE — 3008F BODY MASS INDEX DOCD: CPT | Mod: CPTII,,, | Performed by: REHABILITATION UNIT

## 2024-02-19 PROCEDURE — 73610 X-RAY EXAM OF ANKLE: CPT | Mod: RT,,, | Performed by: REHABILITATION UNIT

## 2024-02-19 PROCEDURE — 1159F MED LIST DOCD IN RCRD: CPT | Mod: CPTII,,, | Performed by: REHABILITATION UNIT

## 2024-02-19 PROCEDURE — 3077F SYST BP >= 140 MM HG: CPT | Mod: CPTII,,, | Performed by: REHABILITATION UNIT

## 2024-02-19 NOTE — PROGRESS NOTES
Subjective:      Patient ID: Cha Puckett is a 33 y.o. female.    Chief Complaint: Follow-up (5 mth s/p ORIF R ankle sx 09/21/23-12/20/23 states she is having minimal pain. Swelling when she is active. )    Date of procedure: 9/21/23     Procedure:  Open reduction internal fixation of right distal fibula fracture fracture  Syndesmotic fixation   Non-instrumented treatment of posterior malleolus fracture      hCa Puckett returns to the clinic today for post-operative examination. Patient is s/p the above procedure.  She is doing very well.  No pain.  Out of bracing.  Home exercises going well.  Ambulating without device.  Swelling greatly improved.  She is pleased.     Comprehensive review of systems completed and negative except as per HPI.  Objective:     Vitals:    02/19/24 1011   BP: (!) 172/98   Pulse: (!) (P) 55         Gen: No acute distress    RLE:  Incision well healed without signs of infection or dehiscence.  No significant swelling normal for postoperative timeframe.  No tenderness to palpation over the peroneals   Range of motion is full at this time.     Well-perfused lower extremity with capillary refill less than 2 seconds  Sensation intact to light touch to tibial nerve, superficial and deep peroneal distributions.  5 out of 5 EHL/FHL, tibials anterior, and gastrocsoleus complex  Calf soft and easily compressible without clinical sign of DVT. No palpable popliteal lymphadenopathy.  No pain out of proportion to expectation. No excessive pain with passive stretch of muscles in any compartment. Temperature and color of extremity appropriate. Brisk capillary refill of digits. Compartments compressible without evidence of excessive firmness.     Imaging:    3 view XR R ankle obtained today show postop changes with hardware in appropriate position with healed fracture.     Assessment:       Encounter Diagnosis   Name Primary?    Closed fracture of distal end of right fibula with routine healing,  unspecified fracture morphology, subsequent encounter Yes         Plan:       Cha was seen today for follow-up.    Diagnoses and all orders for this visit:    Closed fracture of distal end of right fibula with routine healing, unspecified fracture morphology, subsequent encounter  -     X-Ray Ankle Complete Right; Future      Status post the above-stated procedure and doing well.   OTC pain medications as needed. Ice encouraged. Risk of medications were discussed.   Continue home exercises for prolonged and sustained ankle health  Continue all activities and follow up as needed  All of the patient's questions and concerns were addressed during the visit. Patient will call or contact our office with any new issues or concerns.

## 2024-02-19 NOTE — LETTER
February 26, 2024       [facility]  [address], [address]  [place] [address]  Phone: [phone]  Fax: [phone]       Patient: Cha Puckett   YOB: 1990  Date of Visit: 02/26/2024    To Whom It May Concern:    Timo Puckett  was at [facility] on 02/26/2024. The patient may return to work on 2/20/24 with no restrictions. If you have any questions or concerns, or if I can be of further assistance, please do not hesitate to contact me.    Sincerely,    Byron Fraser M.D.

## 2024-02-26 ENCOUNTER — PATIENT MESSAGE (OUTPATIENT)
Dept: ORTHOPEDICS | Facility: CLINIC | Age: 34
End: 2024-02-26
Payer: COMMERCIAL

## (undated) DEVICE — DRAPE FULL SHEET 70X100IN

## (undated) DEVICE — GLOVE SENSICARE PI ORTHO LT 7

## (undated) DEVICE — COVER EQUIPMENT 36X25

## (undated) DEVICE — PADDING WYTEX UNDRCST 6INX4YD

## (undated) DEVICE — POSITIONER LEG CONCAVE 45D AGL

## (undated) DEVICE — BUCKET PLASTER DISPOSABLE

## (undated) DEVICE — GAUZE SPONGE 4X4 12PLY

## (undated) DEVICE — PAD ABD 8X10 STERILE

## (undated) DEVICE — SUT VICRYL BR 1 GEN 27 CT-1

## (undated) DEVICE — ELECTRODE PATIENT RETURN DISP

## (undated) DEVICE — BIT DRILL SLEEVE 2.0MM

## (undated) DEVICE — GOWN POLY REINF X-LONG 2XL

## (undated) DEVICE — BANDAGE VELCLOSE ELAS 6INX5YD

## (undated) DEVICE — DRESSING XEROFORM FOIL PK 1X8

## (undated) DEVICE — SPONGE LAP STRL 18X18IN

## (undated) DEVICE — DRAPE INCISE IOBAN 2 23X23IN

## (undated) DEVICE — SUT BONE WAX 2.5 GRMS 12/BX

## (undated) DEVICE — Device

## (undated) DEVICE — TAPE SILK 3IN

## (undated) DEVICE — COVER TABLE HVY DTY 60X90IN

## (undated) DEVICE — KIT SURGICAL TURNOVER

## (undated) DEVICE — BIT DRILL ANKLE FRACTURE 2.7MM

## (undated) DEVICE — SUT MONOCRYL 3-0 PS-2 UND

## (undated) DEVICE — BIT DRILL NLOK SCREW 2.5X110

## (undated) DEVICE — GLOVE SENSICARE PI GRN 7

## (undated) DEVICE — DRAPE T EXTRM SURG 121X128X90

## (undated) DEVICE — DRAPE STERI U-SHAPED 47X51IN

## (undated) DEVICE — BIT DRILL GRADUATED 2 MM

## (undated) DEVICE — IMPLANTABLE DEVICE
Type: IMPLANTABLE DEVICE | Site: ANKLE | Status: NON-FUNCTIONAL
Removed: 2023-09-21

## (undated) DEVICE — GUIDEWIRE TROCAR TIP.062
Type: IMPLANTABLE DEVICE | Site: ANKLE | Status: NON-FUNCTIONAL
Removed: 2023-09-21

## (undated) DEVICE — APPLICATOR CHLORAPREP ORN 26ML

## (undated) DEVICE — BANDAGE VELCLOSE ELAS 4INX5YD

## (undated) DEVICE — SOL NACL IRR 1000ML BTL

## (undated) DEVICE — DRAPE C-ARMOR EQUIPMENT COVER